# Patient Record
Sex: MALE | Race: WHITE | Employment: FULL TIME | URBAN - METROPOLITAN AREA
[De-identification: names, ages, dates, MRNs, and addresses within clinical notes are randomized per-mention and may not be internally consistent; named-entity substitution may affect disease eponyms.]

---

## 2020-09-02 ENCOUNTER — APPOINTMENT (OUTPATIENT)
Dept: GENERAL RADIOLOGY | Age: 65
DRG: 917 | End: 2020-09-02
Payer: COMMERCIAL

## 2020-09-02 ENCOUNTER — HOSPITAL ENCOUNTER (INPATIENT)
Age: 65
LOS: 2 days | Discharge: HOME | DRG: 917 | End: 2020-09-04
Attending: EMERGENCY MEDICINE | Admitting: INTERNAL MEDICINE
Payer: COMMERCIAL

## 2020-09-02 ENCOUNTER — APPOINTMENT (OUTPATIENT)
Dept: CT IMAGING | Age: 65
DRG: 917 | End: 2020-09-02
Payer: COMMERCIAL

## 2020-09-02 PROBLEM — R79.89 ELEVATED TROPONIN: Status: ACTIVE | Noted: 2020-09-02

## 2020-09-02 PROBLEM — R77.8 ELEVATED TROPONIN: Status: ACTIVE | Noted: 2020-09-02

## 2020-09-02 LAB
-: NORMAL
ABSOLUTE EOS #: 0 K/UL (ref 0–0.44)
ABSOLUTE IMMATURE GRANULOCYTE: 0 K/UL (ref 0–0.3)
ABSOLUTE LYMPH #: 0.57 K/UL (ref 1.1–3.7)
ABSOLUTE MONO #: 0.48 K/UL (ref 0.1–1.2)
ACETAMINOPHEN LEVEL: <5 UG/ML (ref 10–30)
ALBUMIN SERPL-MCNC: 3.4 G/DL (ref 3.5–5.2)
ALBUMIN/GLOBULIN RATIO: 1.1 (ref 1–2.5)
ALP BLD-CCNC: 104 U/L (ref 40–129)
ALT SERPL-CCNC: 58 U/L (ref 5–41)
AMMONIA: 80 UMOL/L (ref 16–60)
AMORPHOUS: NORMAL
AMPHETAMINE SCREEN URINE: NEGATIVE
ANION GAP SERPL CALCULATED.3IONS-SCNC: 12 MMOL/L (ref 9–17)
AST SERPL-CCNC: 71 U/L
BACTERIA: NORMAL
BARBITURATE SCREEN URINE: NEGATIVE
BASOPHILS # BLD: 0 % (ref 0–2)
BASOPHILS ABSOLUTE: 0 K/UL (ref 0–0.2)
BENZODIAZEPINE SCREEN, URINE: NEGATIVE
BILIRUB SERPL-MCNC: 0.28 MG/DL (ref 0.3–1.2)
BILIRUBIN URINE: NEGATIVE
BUN BLDV-MCNC: 19 MG/DL (ref 8–23)
BUN/CREAT BLD: ABNORMAL (ref 9–20)
BUPRENORPHINE URINE: ABNORMAL
CALCIUM SERPL-MCNC: 8.4 MG/DL (ref 8.6–10.4)
CANNABINOID SCREEN URINE: POSITIVE
CASTS UA: NORMAL /LPF (ref 0–8)
CHLORIDE BLD-SCNC: 105 MMOL/L (ref 98–107)
CHOLESTEROL/HDL RATIO: 3.3
CHOLESTEROL: 89 MG/DL
CO2: 22 MMOL/L (ref 20–31)
COCAINE METABOLITE, URINE: POSITIVE
COLOR: YELLOW
CREAT SERPL-MCNC: 1.2 MG/DL (ref 0.7–1.2)
CRYSTALS, UA: NORMAL /HPF
DIFFERENTIAL TYPE: ABNORMAL
EKG ATRIAL RATE: 96 BPM
EKG P AXIS: 74 DEGREES
EKG P-R INTERVAL: 142 MS
EKG Q-T INTERVAL: 362 MS
EKG QRS DURATION: 82 MS
EKG QTC CALCULATION (BAZETT): 457 MS
EKG R AXIS: 54 DEGREES
EKG T AXIS: 54 DEGREES
EKG VENTRICULAR RATE: 96 BPM
EOSINOPHILS RELATIVE PERCENT: 0 % (ref 1–4)
EPITHELIAL CELLS UA: NORMAL /HPF (ref 0–5)
ESTIMATED AVERAGE GLUCOSE: 114 MG/DL
ETHANOL PERCENT: <0.01 %
ETHANOL: <10 MG/DL
GFR AFRICAN AMERICAN: >60 ML/MIN
GFR NON-AFRICAN AMERICAN: >60 ML/MIN
GFR SERPL CREATININE-BSD FRML MDRD: ABNORMAL ML/MIN/{1.73_M2}
GFR SERPL CREATININE-BSD FRML MDRD: ABNORMAL ML/MIN/{1.73_M2}
GLUCOSE BLD-MCNC: 157 MG/DL (ref 70–99)
GLUCOSE URINE: NEGATIVE
HBA1C MFR BLD: 5.6 % (ref 4–6)
HCT VFR BLD CALC: 39.5 % (ref 40.7–50.3)
HCT VFR BLD CALC: 44.5 % (ref 40.7–50.3)
HDLC SERPL-MCNC: 27 MG/DL
HEMOGLOBIN: 12.7 G/DL (ref 13–17)
HEMOGLOBIN: 14.5 G/DL (ref 13–17)
IMMATURE GRANULOCYTES: 0 %
KETONES, URINE: NEGATIVE
LDL CHOLESTEROL: 48 MG/DL (ref 0–130)
LEUKOCYTE ESTERASE, URINE: NEGATIVE
LYMPHOCYTES # BLD: 6 % (ref 24–43)
MCH RBC QN AUTO: 31.3 PG (ref 25.2–33.5)
MCH RBC QN AUTO: 31.9 PG (ref 25.2–33.5)
MCHC RBC AUTO-ENTMCNC: 32.2 G/DL (ref 28.4–34.8)
MCHC RBC AUTO-ENTMCNC: 32.6 G/DL (ref 28.4–34.8)
MCV RBC AUTO: 97.3 FL (ref 82.6–102.9)
MCV RBC AUTO: 98 FL (ref 82.6–102.9)
MDMA URINE: ABNORMAL
METHADONE SCREEN, URINE: NEGATIVE
METHAMPHETAMINE, URINE: ABNORMAL
MONOCYTES # BLD: 5 % (ref 3–12)
MORPHOLOGY: NORMAL
MUCUS: NORMAL
NITRITE, URINE: NEGATIVE
NRBC AUTOMATED: 0 PER 100 WBC
NRBC AUTOMATED: 0 PER 100 WBC
OPIATES, URINE: NEGATIVE
OTHER OBSERVATIONS UA: NORMAL
OXYCODONE SCREEN URINE: NEGATIVE
PARTIAL THROMBOPLASTIN TIME: 28.4 SEC (ref 20.5–30.5)
PARTIAL THROMBOPLASTIN TIME: 35 SEC (ref 20.5–30.5)
PDW BLD-RTO: 13.2 % (ref 11.8–14.4)
PDW BLD-RTO: 13.2 % (ref 11.8–14.4)
PH UA: 5 (ref 5–8)
PHENCYCLIDINE, URINE: NEGATIVE
PLATELET # BLD: 108 K/UL (ref 138–453)
PLATELET # BLD: ABNORMAL K/UL (ref 138–453)
PLATELET ESTIMATE: ABNORMAL
PLATELET, FLUORESCENCE: 96 K/UL (ref 138–453)
PLATELET, IMMATURE FRACTION: 3.2 % (ref 1.1–10.3)
PMV BLD AUTO: 11.3 FL (ref 8.1–13.5)
PMV BLD AUTO: ABNORMAL FL (ref 8.1–13.5)
POTASSIUM SERPL-SCNC: 3.9 MMOL/L (ref 3.7–5.3)
PROPOXYPHENE, URINE: ABNORMAL
PROTEIN UA: NEGATIVE
RBC # BLD: 4.06 M/UL (ref 4.21–5.77)
RBC # BLD: 4.54 M/UL (ref 4.21–5.77)
RBC # BLD: ABNORMAL 10*6/UL
RBC UA: NORMAL /HPF (ref 0–4)
RENAL EPITHELIAL, UA: NORMAL /HPF
SALICYLATE LEVEL: <1 MG/DL (ref 3–10)
SEG NEUTROPHILS: 89 % (ref 36–65)
SEGMENTED NEUTROPHILS ABSOLUTE COUNT: 8.45 K/UL (ref 1.5–8.1)
SODIUM BLD-SCNC: 139 MMOL/L (ref 135–144)
SPECIFIC GRAVITY UA: 1.02 (ref 1–1.03)
TEST INFORMATION: ABNORMAL
TOTAL PROTEIN: 6.5 G/DL (ref 6.4–8.3)
TOXIC TRICYCLIC SC,BLOOD: NEGATIVE
TRICHOMONAS: NORMAL
TRICYCLIC ANTIDEPRESSANTS, UR: ABNORMAL
TRIGL SERPL-MCNC: 70 MG/DL
TROPONIN INTERP: ABNORMAL
TROPONIN T: ABNORMAL NG/ML
TROPONIN, HIGH SENSITIVITY: 29 NG/L (ref 0–22)
TROPONIN, HIGH SENSITIVITY: 32 NG/L (ref 0–22)
TROPONIN, HIGH SENSITIVITY: 58 NG/L (ref 0–22)
TROPONIN, HIGH SENSITIVITY: 67 NG/L (ref 0–22)
TURBIDITY: CLEAR
URINE HGB: NEGATIVE
UROBILINOGEN, URINE: NORMAL
VLDLC SERPL CALC-MCNC: ABNORMAL MG/DL (ref 1–30)
WBC # BLD: 6.8 K/UL (ref 3.5–11.3)
WBC # BLD: 9.5 K/UL (ref 3.5–11.3)
WBC # BLD: ABNORMAL 10*3/UL
WBC UA: NORMAL /HPF (ref 0–5)
YEAST: NORMAL

## 2020-09-02 PROCEDURE — 2580000003 HC RX 258: Performed by: STUDENT IN AN ORGANIZED HEALTH CARE EDUCATION/TRAINING PROGRAM

## 2020-09-02 PROCEDURE — 85055 RETICULATED PLATELET ASSAY: CPT

## 2020-09-02 PROCEDURE — 82140 ASSAY OF AMMONIA: CPT

## 2020-09-02 PROCEDURE — 93005 ELECTROCARDIOGRAM TRACING: CPT | Performed by: STUDENT IN AN ORGANIZED HEALTH CARE EDUCATION/TRAINING PROGRAM

## 2020-09-02 PROCEDURE — 99285 EMERGENCY DEPT VISIT HI MDM: CPT

## 2020-09-02 PROCEDURE — 80053 COMPREHEN METABOLIC PANEL: CPT

## 2020-09-02 PROCEDURE — 96374 THER/PROPH/DIAG INJ IV PUSH: CPT

## 2020-09-02 PROCEDURE — 83036 HEMOGLOBIN GLYCOSYLATED A1C: CPT

## 2020-09-02 PROCEDURE — 99223 1ST HOSP IP/OBS HIGH 75: CPT | Performed by: INTERNAL MEDICINE

## 2020-09-02 PROCEDURE — 36415 COLL VENOUS BLD VENIPUNCTURE: CPT

## 2020-09-02 PROCEDURE — 96375 TX/PRO/DX INJ NEW DRUG ADDON: CPT

## 2020-09-02 PROCEDURE — 70450 CT HEAD/BRAIN W/O DYE: CPT

## 2020-09-02 PROCEDURE — 85025 COMPLETE CBC W/AUTO DIFF WBC: CPT

## 2020-09-02 PROCEDURE — 6370000000 HC RX 637 (ALT 250 FOR IP): Performed by: STUDENT IN AN ORGANIZED HEALTH CARE EDUCATION/TRAINING PROGRAM

## 2020-09-02 PROCEDURE — 93010 ELECTROCARDIOGRAM REPORT: CPT | Performed by: INTERNAL MEDICINE

## 2020-09-02 PROCEDURE — 80061 LIPID PANEL: CPT

## 2020-09-02 PROCEDURE — G0480 DRUG TEST DEF 1-7 CLASSES: HCPCS

## 2020-09-02 PROCEDURE — 84484 ASSAY OF TROPONIN QUANT: CPT

## 2020-09-02 PROCEDURE — 6360000002 HC RX W HCPCS: Performed by: STUDENT IN AN ORGANIZED HEALTH CARE EDUCATION/TRAINING PROGRAM

## 2020-09-02 PROCEDURE — 93005 ELECTROCARDIOGRAM TRACING: CPT | Performed by: EMERGENCY MEDICINE

## 2020-09-02 PROCEDURE — 6360000002 HC RX W HCPCS: Performed by: EMERGENCY MEDICINE

## 2020-09-02 PROCEDURE — 81001 URINALYSIS AUTO W/SCOPE: CPT

## 2020-09-02 PROCEDURE — 85730 THROMBOPLASTIN TIME PARTIAL: CPT

## 2020-09-02 PROCEDURE — 85027 COMPLETE CBC AUTOMATED: CPT

## 2020-09-02 PROCEDURE — 80307 DRUG TEST PRSMV CHEM ANLYZR: CPT

## 2020-09-02 PROCEDURE — 2060000000 HC ICU INTERMEDIATE R&B

## 2020-09-02 PROCEDURE — 71045 X-RAY EXAM CHEST 1 VIEW: CPT

## 2020-09-02 RX ORDER — ONDANSETRON 2 MG/ML
4 INJECTION INTRAMUSCULAR; INTRAVENOUS ONCE
Status: COMPLETED | OUTPATIENT
Start: 2020-09-02 | End: 2020-09-02

## 2020-09-02 RX ORDER — ASPIRIN 81 MG/1
324 TABLET, CHEWABLE ORAL ONCE
Status: COMPLETED | OUTPATIENT
Start: 2020-09-02 | End: 2020-09-02

## 2020-09-02 RX ORDER — SODIUM CHLORIDE 0.9 % (FLUSH) 0.9 %
10 SYRINGE (ML) INJECTION EVERY 12 HOURS SCHEDULED
Status: DISCONTINUED | OUTPATIENT
Start: 2020-09-02 | End: 2020-09-04 | Stop reason: HOSPADM

## 2020-09-02 RX ORDER — SODIUM CHLORIDE 0.9 % (FLUSH) 0.9 %
10 SYRINGE (ML) INJECTION PRN
Status: DISCONTINUED | OUTPATIENT
Start: 2020-09-02 | End: 2020-09-04 | Stop reason: HOSPADM

## 2020-09-02 RX ORDER — HEPARIN SODIUM 1000 [USP'U]/ML
4000 INJECTION, SOLUTION INTRAVENOUS; SUBCUTANEOUS ONCE
Status: COMPLETED | OUTPATIENT
Start: 2020-09-02 | End: 2020-09-02

## 2020-09-02 RX ORDER — ACETAMINOPHEN 325 MG/1
650 TABLET ORAL EVERY 6 HOURS PRN
Status: DISCONTINUED | OUTPATIENT
Start: 2020-09-02 | End: 2020-09-04 | Stop reason: HOSPADM

## 2020-09-02 RX ORDER — ASPIRIN 81 MG/1
81 TABLET, CHEWABLE ORAL DAILY
Status: DISCONTINUED | OUTPATIENT
Start: 2020-09-03 | End: 2020-09-04 | Stop reason: HOSPADM

## 2020-09-02 RX ORDER — 0.9 % SODIUM CHLORIDE 0.9 %
1000 INTRAVENOUS SOLUTION INTRAVENOUS ONCE
Status: COMPLETED | OUTPATIENT
Start: 2020-09-02 | End: 2020-09-02

## 2020-09-02 RX ORDER — POLYETHYLENE GLYCOL 3350 17 G/17G
17 POWDER, FOR SOLUTION ORAL DAILY PRN
Status: DISCONTINUED | OUTPATIENT
Start: 2020-09-02 | End: 2020-09-04 | Stop reason: HOSPADM

## 2020-09-02 RX ORDER — ACETAMINOPHEN 325 MG/1
650 TABLET ORAL EVERY 4 HOURS PRN
Status: DISCONTINUED | OUTPATIENT
Start: 2020-09-02 | End: 2020-09-04 | Stop reason: HOSPADM

## 2020-09-02 RX ORDER — HEPARIN SODIUM 1000 [USP'U]/ML
4000 INJECTION, SOLUTION INTRAVENOUS; SUBCUTANEOUS PRN
Status: DISCONTINUED | OUTPATIENT
Start: 2020-09-02 | End: 2020-09-03

## 2020-09-02 RX ORDER — PROMETHAZINE HYDROCHLORIDE 25 MG/1
12.5 TABLET ORAL EVERY 6 HOURS PRN
Status: DISCONTINUED | OUTPATIENT
Start: 2020-09-02 | End: 2020-09-04 | Stop reason: HOSPADM

## 2020-09-02 RX ORDER — ATORVASTATIN CALCIUM 40 MG/1
40 TABLET, FILM COATED ORAL NIGHTLY
Status: DISCONTINUED | OUTPATIENT
Start: 2020-09-02 | End: 2020-09-04 | Stop reason: HOSPADM

## 2020-09-02 RX ORDER — ACETAMINOPHEN 650 MG/1
650 SUPPOSITORY RECTAL EVERY 6 HOURS PRN
Status: DISCONTINUED | OUTPATIENT
Start: 2020-09-02 | End: 2020-09-04 | Stop reason: HOSPADM

## 2020-09-02 RX ORDER — HEPARIN SODIUM 1000 [USP'U]/ML
2000 INJECTION, SOLUTION INTRAVENOUS; SUBCUTANEOUS PRN
Status: DISCONTINUED | OUTPATIENT
Start: 2020-09-02 | End: 2020-09-03

## 2020-09-02 RX ORDER — HEPARIN SODIUM 10000 [USP'U]/100ML
12 INJECTION, SOLUTION INTRAVENOUS CONTINUOUS
Status: DISCONTINUED | OUTPATIENT
Start: 2020-09-02 | End: 2020-09-03

## 2020-09-02 RX ORDER — ONDANSETRON 2 MG/ML
4 INJECTION INTRAMUSCULAR; INTRAVENOUS EVERY 6 HOURS PRN
Status: DISCONTINUED | OUTPATIENT
Start: 2020-09-02 | End: 2020-09-04 | Stop reason: HOSPADM

## 2020-09-02 RX ADMIN — SODIUM CHLORIDE, PRESERVATIVE FREE 10 ML: 5 INJECTION INTRAVENOUS at 20:56

## 2020-09-02 RX ADMIN — ASPIRIN 324 MG: 81 TABLET, CHEWABLE ORAL at 07:54

## 2020-09-02 RX ADMIN — SODIUM CHLORIDE, PRESERVATIVE FREE 10 ML: 5 INJECTION INTRAVENOUS at 20:55

## 2020-09-02 RX ADMIN — HEPARIN SODIUM 2000 UNITS: 1000 INJECTION INTRAVENOUS; SUBCUTANEOUS at 18:13

## 2020-09-02 RX ADMIN — ONDANSETRON 4 MG: 2 INJECTION INTRAMUSCULAR; INTRAVENOUS at 05:38

## 2020-09-02 RX ADMIN — HEPARIN SODIUM AND DEXTROSE 12 UNITS/KG/HR: 10000; 5 INJECTION INTRAVENOUS at 09:54

## 2020-09-02 RX ADMIN — ATORVASTATIN CALCIUM 40 MG: 80 TABLET, FILM COATED ORAL at 13:42

## 2020-09-02 RX ADMIN — HEPARIN SODIUM 4000 UNITS: 1000 INJECTION INTRAVENOUS; SUBCUTANEOUS at 09:55

## 2020-09-02 RX ADMIN — SODIUM CHLORIDE 1000 ML: 9 INJECTION, SOLUTION INTRAVENOUS at 05:38

## 2020-09-02 NOTE — ED NOTES
Wife at bedside, updated on [address], [facility]  09/02/20 5618

## 2020-09-02 NOTE — ED NOTES
ED to inpatient nurses report    Chief Complaint   Patient presents with    Seizures      Present to ED from home  LOC: alert x4  Vital signs   Vitals:    09/02/20 1145 09/02/20 1230 09/02/20 1247 09/02/20 1252   BP: 103/70 101/68 97/66 104/72   Pulse: 58 62 67 76   Resp: 11 15 11 12   Temp:       TempSrc:       SpO2: 96% 97% 96% 96%   Weight:       Height:          Oxygen Baseline 2L    Current needs required 2L  LDAs:   Peripheral IV 09/02/20 Left Forearm (Active)       Peripheral IV 09/02/20 Right Wrist (Active)   Site Assessment Clean;Dry; Intact 09/02/20 0809   Line Status Blood return noted;Brisk blood return;Specimen collected; Flushed 09/02/20 0809   Dressing Status Clean;Dry; Intact 09/02/20 0809   Dressing Intervention New 09/02/20 0809     Mobility: up with assistance  Pending ED orders: n/a  Present condition: stable, A&Ox4  Code Status: full code  Consults:  [x]  Hospitalist  Completed  [x] yes [] no  []  Medicine  Completed  [] yes [] No  [x]  Cardiology  Completed  [] yes [x] No  []  GI   Completed  [] yes [] No  []  Neurology  Completed  [] yes [] No  []  Nephrology Completed  [] yes [] No  []  Vascular  Completed  [] yes [] No   []  Surgery  Completed  [] yes [] No   []  Urology  Completed  [] yes [] No   []  Plastics  Completed  [] yes [] No   []  ENT  Completed  [] yes [] No   []  Other  Completed  [] yes [] No   Pertinent event(s) no seizure activity, resting comfortable. Elevated trops trending up, on heparin, next APTT due at 1600 (will send before pt leaves the floor).   Pertinent event(s)   Electronically signed by Cornell Ribeiro RN on 9/2/2020 at 3:24 PM       Cornell Ribeiro RN  09/02/20 9299

## 2020-09-02 NOTE — ED NOTES
Repeat troponin collected, labeled, and sent to lab via tube system      Arpan Gonzales RN  09/02/20 0307

## 2020-09-02 NOTE — ED NOTES
Pt resting with eyes closed. RR even and non labored. NAD noted.    Call light in [facility], RN  09/02/20 0464

## 2020-09-02 NOTE — CARE COORDINATION
Case Management Initial Discharge Plan  Bala Goldsmith,             Met with:patient to discuss discharge plans. Information verified: address, contacts, phone number, , insurance Yes    Emergency Contact/Next of Kin name & number: Hosea Holt wife [phone]    PCP: No primary care provider on file. Date of last visit: none, list provided    Insurance Provider: [facility], called wife and asked if the next time she visits if she could bring his insurance card. She will be up later tonight and will bring insurnance card with her. Called info/reg and informed them wife to bring insurance card. Called Shari Wynn in HELP and left him vm that patient has insurance. Discharge Planning    Living Arrangements:  Spouse/Significant Other   Support Systems:  Spouse/Significant Other    Home has 2 stories  6 stairs to climb to get into front door, 1 flight stairs to climb to reach second floor  Location of bedroom/bathroom in home main    Patient able to perform ADL's:Independent    Current Services (outpatient & in home) none  DME equipment: none  DME provider: n/a    Receiving oral anticoagulation therapy? No    If indicated:   Physician managing anticoagulation treatment: none  Where does patient obtain lab work for ATC treatment? n/a      Potential Assistance Needed:  N/A    Patient agreeable to home care: No  [place] of choice provided:  n/a    Prior SNF/Rehab Placement and Facility: none  Agreeable to SNF/Rehab: No  [place] of choice provided: n/a     Evaluation: n/a    Expected Discharge date:       Patient expects to be discharged to:  home  Follow Up Appointment: Best Day/ Time:      Transportation provider: wife  Transportation arrangements needed for discharge: No    Readmission Risk              Risk of Unplanned Readmission:        9             Does patient have a readmission risk score greater than 14?: No  If yes, follow-up appointment must be made within 7 days of discharge.      Goals of Care: improve pain control      Discharge Plan: home with wife. PCP list provided. He has LV [employer]. Wife called to bring in card. Info registration called to notify them wife will bring in card.            Electronically signed by Tawanda Iverson RN on 9/2/20 at 5:38 PM EDT

## 2020-09-02 NOTE — ED NOTES
Pt ED for possible seizure like activity as pt family found pt unresponsive at home on the couch. Pt arrives answering all questions appropriately. Pt denies any drug use but EMS gave 2mg and pt became more alert. Pt denies any pain or medical needs at this time. Pt placed on full cardiac monitor, EKG complete, blood obtained awaiting orders.       [address] [place] [address]  96/78/71 [id number]

## 2020-09-02 NOTE — ED PROVIDER NOTES
[address]  ED  Emergency Department Encounter  EmergencyMedicine Resident     Pt Karina Ledezma  MRN: [id number]  [place] 1955  Date of evaluation: 9/2/20  PCP:  No primary care provider on file. CHIEF COMPLAINT       No chief complaint on file. HISTORY OF PRESENT ILLNESS  (Location/Symptom, Timing/Onset, Context/Setting, Quality, Duration, Modifying Factors, Severity.)      Leonarda Carrillo is a 63 y.o. male who presents with altered mental status, found on hands and knees by family, brought in by EMS who gave 1 treatment of Narcan in route. Reported that pupils went from pinpoint to larger. Patient does admit to past history of opiate use but denies having used today. Denies history of seizures denies history of stroke denies fall or injury. Patient complains of feeling generally unwell but was well prior to the episode today. No witnessed seizure-like activity. No signs of trauma, limited to no medical history in chart. Patient states no regular medications no blood thinners no antiplatelet. Is afebrile hemodynamically stable on triage. Extensive track marks on both arms, presumably from patient's admitted history of IV drug use. PAST MEDICAL / SURGICAL / SOCIAL / FAMILY HISTORY      has no past medical history on file. has no past surgical history on file.     Social History     Socioeconomic History    Marital status:      Spouse name: Not on file    Number of children: Not on file    Years of education: Not on file    Highest education level: Not on file   Occupational History    Not on file   Social Needs    Financial resource strain: Not on file    Food insecurity     Worry: Not on file     Inability: Not on file    Transportation needs     Medical: Not on file     Non-medical: Not on file   Tobacco Use    Smoking status: Current Every Day Smoker    Smokeless tobacco: Never Used   Substance and Sexual Activity    Alcohol use: Not on membranes moist, oropharynx clear     Neck: Trachea midline, no JVD. Lungs: No evidence of increased work of breathing. CTA B/L, no wheezes/rhonchi     Cardiovascular: RRR, no murmur, 2+ peripheral pulses bilaterally. Cap refill less than 2 seconds. No lower extremity edema noted    Abdomen: Soft, nontender. No guarding or rebound tenderness. Neurologic: Oriented to person place not time or event, pupils equal and reactive, 5 mm bilaterally status post Narcan, following commands no drift no facial weakness    Extremities: Skin warm, dry and intact.     DIFFERENTIAL  DIAGNOSIS     PLAN (LABS / IMAGING / EKG):  Orders Placed This Encounter   Procedures    CT HEAD WO CONTRAST    XR CHEST PORTABLE    Urine Drug Screen    TOX SCR, BLD, ED    CBC WITH AUTO DIFFERENTIAL    Comprehensive Metabolic Panel    Urinalysis with microscopic    Troponin    AMMONIA    Troponin    Inpatient consult to Hospitalist    Inpatient consult to Cardiology    EKG 12 Lead    EKG 12 Lead       MEDICATIONS ORDERED:  Orders Placed This Encounter   Medications    0.9 % sodium chloride bolus    ondansetron (ZOFRAN) injection 4 mg    aspirin chewable tablet 324 mg           DIAGNOSTIC RESULTS / EMERGENCY DEPARTMENT COURSE / MDM     LABS:  Results for orders placed or performed during the hospital encounter of 09/02/20   TOX SCR, BLD, ED   Result Value Ref Range    Acetaminophen Level <5 (L) 10 - 30 ug/mL    Ethanol <10 <10 mg/dL    Ethanol percent <7.105 <6.659 %    Salicylate Lvl <1 (L) 3 - 10 mg/dL    Toxic Tricyclic Sc,Blood NEGATIVE NEGATIVE   CBC WITH AUTO DIFFERENTIAL   Result Value Ref Range    WBC 9.5 3.5 - 11.3 k/uL    RBC 4.54 4.21 - 5.77 m/uL    Hemoglobin 14.5 13.0 - 17.0 g/dL    Hematocrit 44.5 40.7 - 50.3 %    MCV 98.0 82.6 - 102.9 fL    MCH 31.9 25.2 - 33.5 pg    MCHC 32.6 28.4 - 34.8 g/dL    RDW 13.2 11.8 - 14.4 %    Platelets 179 (L) 976 - 453 k/uL    MPV 11.3 8.1 - 13.5 fL    NRBC Automated 0.0 0.0 per 100 WBC    Differential Type NOT REPORTED     WBC Morphology NOT REPORTED     RBC Morphology NOT REPORTED     Platelet Estimate NOT REPORTED     Immature Granulocytes 0 0 %    Seg Neutrophils 89 (H) 36 - 65 %    Lymphocytes 6 (L) 24 - 43 %    Monocytes 5 3 - 12 %    Eosinophils % 0 (L) 1 - 4 %    Basophils 0 0 - 2 %    Absolute Immature Granulocyte 0.00 0.00 - 0.30 k/uL    Segs Absolute 8.45 (H) 1.50 - 8.10 k/uL    Absolute Lymph # 0.57 (L) 1.10 - 3.70 k/uL    Absolute Mono # 0.48 0.10 - 1.20 k/uL    Absolute Eos # 0.00 0.00 - 0.44 k/uL    Basophils Absolute 0.00 0.00 - 0.20 k/uL    Morphology Normal    Comprehensive Metabolic Panel   Result Value Ref Range    Glucose 157 (H) 70 - 99 mg/dL    BUN 19 8 - 23 mg/dL    CREATININE 1.20 0.70 - 1.20 mg/dL    Bun/Cre Ratio NOT REPORTED 9 - 20    Calcium 8.4 (L) 8.6 - 10.4 mg/dL    Sodium 139 135 - 144 mmol/L    Potassium 3.9 3.7 - 5.3 mmol/L    Chloride 105 98 - 107 mmol/L    CO2 22 20 - 31 mmol/L    Anion Gap 12 9 - 17 mmol/L    Alkaline Phosphatase 104 40 - 129 U/L    ALT 58 (H) 5 - 41 U/L    AST 71 (H) <40 U/L    Total Bilirubin 0.28 (L) 0.3 - 1.2 mg/dL    Total Protein 6.5 6.4 - 8.3 g/dL    Alb 3.4 (L) 3.5 - 5.2 g/dL    Albumin/Globulin Ratio 1.1 1.0 - 2.5    GFR Non-African American >60 >60 mL/min    GFR African American >60 >60 mL/min    GFR Comment          GFR Staging NOT REPORTED    Troponin   Result Value Ref Range    Troponin, High Sensitivity 32 (H) 0 - 22 ng/L    Troponin T NOT REPORTED <0.03 ng/mL    Troponin Interp NOT REPORTED    AMMONIA   Result Value Ref Range    Ammonia 80 (H) 16 - 60 umol/L   Troponin   Result Value Ref Range    Troponin, High Sensitivity 58 (HH) 0 - 22 ng/L    Troponin T NOT REPORTED <0.03 ng/mL    Troponin Interp NOT REPORTED    EKG 12 Lead   Result Value Ref Range    Ventricular Rate 96 BPM    Atrial Rate 96 BPM    P-R Interval 142 ms    QRS Duration 82 ms    Q-T Interval 362 ms    QTc Calculation (Bazett) 457 ms    P Axis 74 degrees R Axis 54 degrees    T Axis 54 degrees     RADIOLOGY:  Ct Head Wo Contrast    Result Date: 9/2/2020  EXAMINATION: CT OF THE HEAD WITHOUT CONTRAST  9/2/2020 5:27 am TECHNIQUE: CT of the head was performed without the administration of intravenous contrast. Dose modulation, iterative reconstruction, and/or weight based adjustment of the mA/kV was utilized to reduce the radiation dose to as low as reasonably achievable. COMPARISON: None. HISTORY: ORDERING SYSTEM PROVIDED HISTORY: AMS, possible seizure TECHNOLOGIST PROVIDED HISTORY: AMS, possible seizure FINDINGS: BRAIN/VENTRICLES: There is no acute intracranial hemorrhage, mass effect or midline shift. No abnormal extra-axial fluid collection. The gray-white differentiation is maintained without evidence of an acute infarct. There is no evidence of hydrocephalus. ORBITS: The visualized portion of the orbits demonstrate no acute abnormality. SINUSES: The visualized paranasal sinuses and mastoid air cells demonstrate no acute abnormality. SOFT TISSUES/SKULL:  No acute abnormality of the visualized skull or soft tissues. No acute intracranial abnormality. EKG  Normal sinus rhythm no ST changes suggestive of STEMI, normal intervals  Good R wave progression no ST elevations or depressions    All EKG's are interpreted by the Emergency Department Physician who either signs or Co-signs this chart in the absence of a cardiologist.    EMERGENCY DEPARTMENT COURSE/IMPRESSION:  ED Course as of Sep 02 0755   Wed Sep 02, 2020   0737 Second troponin elevated to 58 from 32, cardiology paged, repeat EKG and chest x-ray ordered patient still not having chest pain.   Reports no history of cardiac disease reports no cocaine use reports no shortness of breath feeling subjectively much better    [RD]   0737 Aspirin ordered    [RD]      ED Course User Index  [RD] Harinder Roberts MD   Altered mental status possible opiate use, EMS suspected possible seizure-like activity but no witnessed seizure-like episode status post Narcan, hemodynamically stable, limited medical history, CT head without bleed  NIH 0  Labs pending  Troponin 32, will trend no chest pain no shortness of breath no tachycardia at the moment. Follow-up repeat troponin  Ammonia mildly elevated no flapping asterixis, mild transaminitis lower suspicion for hepatic encephalopathy as a cause of the altered mental status today. Ethanol 0 tox screen negative. Awaiting UA and drug screen. Spoke to cardiology, sent EKG, and cardiology fellow asked that the troponin be trended and to start heparin if over 66. Will plan to admit to medicine. PROCEDURES:  None    CONSULTS:  IP CONSULT TO HOSPITALIST  IP CONSULT TO CARDIOLOGY    CRITICAL CARE:  None    FINAL IMPRESSION      1. Altered mental status, unspecified altered mental status type          DISPOSITION / PLAN     DISPOSITION        PATIENT REFERRED TO:  No follow-up provider specified.     DISCHARGE MEDICATIONS:  New Prescriptions    No medications on file       Shay Avina MD  Emergency Medicine Resident    (Please note that portions of this note were completed with a voice recognition program.  Efforts were made to edit the dictations but occasionally words aremis-transcribed.)       Shay Avina MD  Resident  09/02/20 7924

## 2020-09-02 NOTE — ED PROVIDER NOTES
intervention. Total critical care time was 15 minutes. This excludes any time for separately reportable procedures.        [facility], DO  09/02/20 [facility], DO  09/02/20 [facility], DO  09/02/20 [address], DO  09/02/20 [address], DO  09/02/20 [address], DO  09/02/20 0800

## 2020-09-02 NOTE — ED NOTES
Bed: 20  Expected date: 9/2/20  Expected time: 4:46 AM  Means of arrival: [facility]  Comments:  LS3  Seizure without history      Jared Ellis RN  09/02/20 5511

## 2020-09-02 NOTE — ED PROVIDER NOTES
[address] HANDOFF       Handoff taken on the following patient from prior Attending Physician:  Pt Name: Lamin Wilson  PCP:  No primary care provider on file. Attestation  I was available and discussed any additional care issues that arose and coordinated the management plans with the resident(s) caring for the patient during my duty period. Any areas of disagreement with resident's documentation of care or procedures are noted on the chart. I was personally present for the key portions of any/all procedures during my duty period. I have documented in the chart those procedures where I was not present during the key portions. CHIEF COMPLAINT     No chief complaint on file. CURRENT MEDICATIONS     Previous Medications  Previous Medications    No medications on file       Encounter Medications  Orders Placed This Encounter   Medications    0.9 % sodium chloride bolus    ondansetron (ZOFRAN) injection 4 mg    aspirin chewable tablet 324 mg       ALLERGIES     has No Known Allergies. RECENT VITALS:   Temp: 98 °F (36.7 °C),  Pulse: 89, Resp: 16, BP: 123/76    RADIOLOGY:   XR CHEST PORTABLE   Final Result   Expiratory exam with mild prominence of the pulmonary   vascularity/interstitial markings, findings suggest mild vascular congestion      Mild streaky bibasilar atelectasis         CT HEAD WO CONTRAST   Final Result   No acute intracranial abnormality.              LABS:  Labs Reviewed   TOX SCR, BLD, ED - Abnormal; Notable for the following components:       Result Value    Acetaminophen Level <5 (*)     Salicylate Lvl <1 (*)     All other components within normal limits   CBC WITH AUTO DIFFERENTIAL - Abnormal; Notable for the following components:    Platelets 123 (*)     Seg Neutrophils 89 (*)     Lymphocytes 6 (*)     Eosinophils % 0 (*)     Segs Absolute 8.45 (*)     Absolute Lymph # 0.57 (*)     All other components within normal limits COMPREHENSIVE METABOLIC PANEL - Abnormal; Notable for the following components:    Glucose 157 (*)     Calcium 8.4 (*)     ALT 58 (*)     AST 71 (*)     Total Bilirubin 0.28 (*)     Alb 3.4 (*)     All other components within normal limits   TROPONIN - Abnormal; Notable for the following components:    Troponin, High Sensitivity 32 (*)     All other components within normal limits   AMMONIA - Abnormal; Notable for the following components:    Ammonia 80 (*)     All other components within normal limits   TROPONIN - Abnormal; Notable for the following components:    Troponin, High Sensitivity 58 (*)     All other components within normal limits   URINE DRUG SCREEN   URINALYSIS WITH MICROSCOPIC           PLAN/ TASKS OUTSTANDING     This patient is a 63 y.o. Male. Patient placed in observation for cardiac work-up. Troponin from 30-60, asymptomatic at this time. .  Cardiology consulted, will likely start heparin.     (Please note that portions of this note were completed with a voice recognition program.  Efforts were made to edit the dictations but occasionally words are mis-transcribed.)    Rabago MD  Attending Emergency Physician       Omero Moreira MD  09/02/20 2545

## 2020-09-02 NOTE — ED PROVIDER NOTES
troponins. The patient is admitted on heparin currently awaiting bed placement. Darrius Brantley MD, F.A.C.E.P.   Attending Emergency Physician    (Please note that portions of this note were completed with a voice recognition program.  Efforts were made to edit the dictations but occasionally words are mis-transcribed.)         Darrius Brantley MD  09/02/20 5735

## 2020-09-02 NOTE — ED NOTES
Pt resting on stretcher in NAD. RR even and non labored. Denies needs at this time. Call light in reach.        German Ramírez RN  09/02/20 2334

## 2020-09-02 NOTE — H&P
[facility]     Department of Internal Medicine - Staff Internal Medicine Teaching Service          ADMISSION NOTE/HISTORY AND PHYSICAL EXAMINATION   Date: 9/2/2020  Patient Name: Dasia Miramontes  Date of admission: 9/2/2020  4:55 AM  YOB: 1955  PCP: No primary care provider on file. History Obtained From:  patient    CHIEF COMPLAINT     Chief complaint: Altered mental status/syncope    HISTORY OF PRESENTING ILLNESS     The patient is a pleasant 63 y.o. male presents with a chief complaint of  altered mental status . He was found unresponsive at home, EMS was called in. No seizure like activity noted. Patient doesn't recall any of those events and said that all he knows he was watching [email or web address] and he just found himself in ambulance after a while. He had no aura,dizziness,diaphoresis or any weird feeling before he passed out. No chest pain, shortness of breath, funny sensation in heart . No previous similar episodes. No previous cardiac history or seizure history. Doesn't take any medications except aspirin for headaches which occur maybe twice a year. Doesn't follow up with any doctor. Doesn't have any flu like symptoms, any pain, fever,urinary or bowel complaints. No nausea or vomiting. No recent injury or trauma or fall.   he does have history of narcotic,opiod use, denies cocaine use    His pupils were pinpoint and  1 narcan was given on way to hospital    On my visit in ED, patient is awake and alert, pupils normal  Afebrile,vitals stable,saturating well on room air. Extensive track marks on both arms,presumably from patient's history of IV drug abuse. troponins rising,32>58>67, aspirin ordered,cardiology consulted. Patient started on heparin. Normal sinus rhythm on EKG, no ischemic changes  CT head negative for any acute abnormality. CXR- mild vascular congestion,mild bibasilar atelactasis  Blood tox negative.   Glucose 157,Hb-10.7  ALT-58,AST-71  Ammonia mildly elevated-80 ,no flapping asterixis  U tox positive for cannabinoids and cocaine. Urine analysis negative for infection      Review of Systems:  General ROS: Completed and except as mentioned above were negative   HEENT ROS: Completed and except as mentioned above were negative   Allergy and Immunology ROS:  Completed and except as mentioned above were negative  Hematological and Lymphatic ROS:  Completed and except as mentioned above were negative  Respiratory ROS:  Completed and except as mentioned above were negative  Cardiovascular ROS:  Completed and except as mentioned above were negative  Gastrointestinal ROS: Completed and except as mentioned above were negative  Genito-Urinary ROS:  Completed and except as mentioned above were negative  Musculoskeletal ROS:  Completed and except as mentioned above were negative  Neurological ROS:  Completed and except as mentioned above were negative  Skin & Dermatological ROS:  Completed and except as mentioned above were negative  Psychological ROS:  Completed and except as mentioned above were negative    PAST MEDICAL HISTORY     No past medical history on file. PAST SURGICAL HISTORY     No past surgical history on file. ALLERGIES     Patient has no known allergies. MEDICATIONS PRIOR TO ADMISSION     Prior to Admission medications    Not on File       SOCIAL HISTORY     Tobacco: current every day smoker,half pack a day for 40 yrs  Alcohol: denies  Illicits: opiod use  Occupation: [employer]    FAMILY HISTORY     No family history on file. PHYSICAL EXAM     Vitals: /72 Comment: standing  Pulse 76   Temp 98 °F (36.7 °C) (Oral)   Resp 12   Ht 6' (1.829 m)   Wt 160 lb (72.6 kg)   SpO2 96%   BMI 21.70 kg/m²   Tmax: Temp (24hrs), Av °F (36.7 °C), Min:98 °F (36.7 °C), Max:98 °F (36.7 °C)    Last Body weight:   Wt Readings from Last 3 Encounters:   20 160 lb (72.6 kg)     Body Mass Index : Body mass index is 21.7 kg/m².       PHYSICAL EXAMINATION:  Constitutional: This is a well developed, well nourished, 18.5-24.9 - Normal 63y.o. year old male who is alert, oriented, cooperative and in no apparent distress. Head:normocephalic and atraumatic. EENT:  PERRLA. No conjunctival injections. Septum was midline, mucosa was without erythema, exudates or cobblestoning. No thrush was noted. Neck: Supple without thyromegaly. No elevated JVP. Trachea was midline. Respiratory: Chest was symmetrical without dullness to percussion. Breath sounds bilaterally were clear to auscultation. There were no wheezes, rhonchi or rales. There is no intercostal retraction or use of accessory muscles. No egophony noted. Cardiovascular: Regular without murmur, clicks, gallops or rubs. Abdomen: Slightly rounded and soft without organomegaly. No rebound, rigidity or guarding was appreciated. Lymphatic: No lymphadenopathy. Musculoskeletal: Normal curvature of the spine. No gross muscle weakness. Extremities:  No lower extremity edema, ulcerations, tenderness, varicosities or erythema. Muscle size, tone and strength are normal.  No involuntary movements are noted. Skin:  Warm and dry. Good color, turgor and pigmentation. No lesions or scars.   No cyanosis or clubbing  Neurological/Psychiatric: The patient's general behavior, level of consciousness, thought content and emotional status is normal.          INVESTIGATIONS     Laboratory Testing:     Recent Results (from the past 24 hour(s))   EKG 12 Lead    Collection Time: 09/02/20  5:03 AM   Result Value Ref Range    Ventricular Rate 96 BPM    Atrial Rate 96 BPM    P-R Interval 142 ms    QRS Duration 82 ms    Q-T Interval 362 ms    QTc Calculation (Bazett) 457 ms    P Axis 74 degrees    R Axis 54 degrees    T Axis 54 degrees   TOX SCR, BLD, ED    Collection Time: 09/02/20  5:27 AM   Result Value Ref Range    Acetaminophen Level <5 (L) 10 - 30 ug/mL    Ethanol <10 <10 mg/dL    Ethanol percent <0.010 <9.939 %    Salicylate Lvl <1 (L) 3 - 10 mg/dL    Toxic Tricyclic Sc,Blood NEGATIVE NEGATIVE   CBC WITH AUTO DIFFERENTIAL    Collection Time: 09/02/20  5:27 AM   Result Value Ref Range    WBC 9.5 3.5 - 11.3 k/uL    RBC 4.54 4.21 - 5.77 m/uL    Hemoglobin 14.5 13.0 - 17.0 g/dL    Hematocrit 44.5 40.7 - 50.3 %    MCV 98.0 82.6 - 102.9 fL    MCH 31.9 25.2 - 33.5 pg    MCHC 32.6 28.4 - 34.8 g/dL    RDW 13.2 11.8 - 14.4 %    Platelets 169 (L) 771 - 453 k/uL    MPV 11.3 8.1 - 13.5 fL    NRBC Automated 0.0 0.0 per 100 WBC    Differential Type NOT REPORTED     WBC Morphology NOT REPORTED     RBC Morphology NOT REPORTED     Platelet Estimate NOT REPORTED     Immature Granulocytes 0 0 %    Seg Neutrophils 89 (H) 36 - 65 %    Lymphocytes 6 (L) 24 - 43 %    Monocytes 5 3 - 12 %    Eosinophils % 0 (L) 1 - 4 %    Basophils 0 0 - 2 %    Absolute Immature Granulocyte 0.00 0.00 - 0.30 k/uL    Segs Absolute 8.45 (H) 1.50 - 8.10 k/uL    Absolute Lymph # 0.57 (L) 1.10 - 3.70 k/uL    Absolute Mono # 0.48 0.10 - 1.20 k/uL    Absolute Eos # 0.00 0.00 - 0.44 k/uL    Basophils Absolute 0.00 0.00 - 0.20 k/uL    Morphology Normal    Comprehensive Metabolic Panel    Collection Time: 09/02/20  5:27 AM   Result Value Ref Range    Glucose 157 (H) 70 - 99 mg/dL    BUN 19 8 - 23 mg/dL    CREATININE 1.20 0.70 - 1.20 mg/dL    Bun/Cre Ratio NOT REPORTED 9 - 20    Calcium 8.4 (L) 8.6 - 10.4 mg/dL    Sodium 139 135 - 144 mmol/L    Potassium 3.9 3.7 - 5.3 mmol/L    Chloride 105 98 - 107 mmol/L    CO2 22 20 - 31 mmol/L    Anion Gap 12 9 - 17 mmol/L    Alkaline Phosphatase 104 40 - 129 U/L    ALT 58 (H) 5 - 41 U/L    AST 71 (H) <40 U/L    Total Bilirubin 0.28 (L) 0.3 - 1.2 mg/dL    Total Protein 6.5 6.4 - 8.3 g/dL    Alb 3.4 (L) 3.5 - 5.2 g/dL    Albumin/Globulin Ratio 1.1 1.0 - 2.5    GFR Non-African American >60 >60 mL/min    GFR African American >60 >60 mL/min    GFR Comment          GFR Staging NOT REPORTED    Troponin    Collection Time: 09/02/20 NEGATIVE    Benzodiazepine Screen, Urine NEGATIVE NEGATIVE    Cocaine Metabolite, Urine POSITIVE (A) NEGATIVE    Methadone Screen, Urine NEGATIVE NEGATIVE    Opiates, Urine NEGATIVE NEGATIVE    Phencyclidine, Urine NEGATIVE NEGATIVE    Propoxyphene, Urine NOT REPORTED NEGATIVE    Cannabinoid Scrn, Ur POSITIVE (A) NEGATIVE    Oxycodone Screen, Ur NEGATIVE NEGATIVE    Methamphetamine, Urine NOT REPORTED NEGATIVE    Tricyclic Antidepressants, Urine NOT REPORTED NEGATIVE    MDMA, Urine NOT REPORTED NEGATIVE    Buprenorphine Urine NOT REPORTED NEGATIVE    Test Information       Assay provides medical screening only. The absence of expected drug(s) and/or metabolite(s) may indicate diluted or adulterated urine, limitations of testing or timing of collection. Urinalysis with microscopic    Collection Time: 09/02/20  1:09 PM   Result Value Ref Range    Color, UA YELLOW YELLOW    Turbidity UA CLEAR CLEAR    Glucose, Ur NEGATIVE NEGATIVE    Bilirubin Urine NEGATIVE NEGATIVE    Ketones, Urine NEGATIVE NEGATIVE    Specific Gravity, UA 1.024 1.005 - 1.030    Urine Hgb NEGATIVE NEGATIVE    pH, UA 5.0 5.0 - 8.0    Protein, UA NEGATIVE NEGATIVE    Urobilinogen, Urine Normal Normal    Nitrite, Urine NEGATIVE NEGATIVE    Leukocyte Esterase, Urine NEGATIVE NEGATIVE    -          WBC, UA None 0 - 5 /HPF    RBC, UA None 0 - 4 /HPF    Casts UA  0 - 8 /LPF     2 TO 5 HYALINE Reference range defined for non-centrifuged specimen. Crystals, UA NOT REPORTED None /HPF    Epithelial Cells UA None 0 - 5 /HPF    Renal Epithelial, UA NOT REPORTED 0 /HPF    Bacteria, UA NOT REPORTED None    Mucus, UA NOT REPORTED None    Trichomonas, UA NOT REPORTED None    Amorphous, UA NOT REPORTED None    Other Observations UA NOT REPORTED NOT REQ. Yeast, UA NOT REPORTED None       Imaging:   Ct Head Wo Contrast    Result Date: 9/2/2020  No acute intracranial abnormality.      Xr Chest Portable    Result Date: 9/2/2020  Expiratory exam with mild prominence of the pulmonary vascularity/interstitial markings, findings suggest mild vascular congestion Mild streaky bibasilar atelectasis       ASSESSMENT & PLAN     ASSESSMENT / PLAN:     IMPRESSION  This is a 63 y.o. male who presented with altered mental status. Active Problems:    Altered mental status/ drug induced encephalopathy  Possibly because of drug overdose,received 1 Narcan before coming to hospital  Utox positive for cannabinoids and cocaine. Ammonia mildly elevated-80 ,no flapping asterixis  Will continue to monitor. Elevated troponin  32>58>67  Plan: started on heparin,cardiology consulted  Started on aspirin and statin. Patient NPO after midnight. F/u with Echo      F/u with lipid panel and HbA1c. DVT ppx: already on heparin  GI ppx: not indicated    PT/OT/SW: ongoing  Discharge Rebecca Bryant MD  Internal Medicine Resident, [facility]; [place], [place]  9/2/2020, 3:02 PM   Attending Physician Statement  I have discussed the care of [facility], including pertinent history and exam findings,  with the resident. I have seen and examined the patient and the key elements of all parts of the encounter have been performed by me. I agree with the assessment, plan and orders as documented by the resident with additions . een in ER    Treatment plan Discussed with nursing staff in detail , all questions answered . Electronically signed by Mitchell Costa MD on   9/2/20 at 9:59 PM EDT    Please note that this chart was generated using voice recognition Dragon dictation software. Although every effort was made to ensure the accuracy of this automated transcription, some errors in transcription may have occurred.

## 2020-09-03 ENCOUNTER — APPOINTMENT (OUTPATIENT)
Dept: CT IMAGING | Age: 65
DRG: 917 | End: 2020-09-03
Payer: COMMERCIAL

## 2020-09-03 PROBLEM — R55 SYNCOPE: Status: ACTIVE | Noted: 2020-09-03

## 2020-09-03 PROBLEM — I21.4 NSTEMI (NON-ST ELEVATED MYOCARDIAL INFARCTION) (HCC): Status: ACTIVE | Noted: 2020-09-03

## 2020-09-03 PROBLEM — F19.10 POLYSUBSTANCE ABUSE (HCC): Status: ACTIVE | Noted: 2020-09-03

## 2020-09-03 PROBLEM — F14.10 COCAINE ABUSE (HCC): Status: ACTIVE | Noted: 2020-09-03

## 2020-09-03 LAB
ABSOLUTE EOS #: 0.08 K/UL (ref 0–0.44)
ABSOLUTE IMMATURE GRANULOCYTE: <0.03 K/UL (ref 0–0.3)
ABSOLUTE LYMPH #: 1.23 K/UL (ref 1.1–3.7)
ABSOLUTE MONO #: 0.45 K/UL (ref 0.1–1.2)
ALBUMIN SERPL-MCNC: 2.9 G/DL (ref 3.5–5.2)
ALBUMIN/GLOBULIN RATIO: 1.1 (ref 1–2.5)
ALP BLD-CCNC: 79 U/L (ref 40–129)
ALT SERPL-CCNC: 40 U/L (ref 5–41)
ANION GAP SERPL CALCULATED.3IONS-SCNC: 12 MMOL/L (ref 9–17)
AST SERPL-CCNC: 42 U/L
BASOPHILS # BLD: 1 % (ref 0–2)
BASOPHILS ABSOLUTE: 0.03 K/UL (ref 0–0.2)
BILIRUB SERPL-MCNC: 0.46 MG/DL (ref 0.3–1.2)
BILIRUBIN DIRECT: 0.16 MG/DL
BILIRUBIN, INDIRECT: 0.3 MG/DL (ref 0–1)
BUN BLDV-MCNC: 16 MG/DL (ref 8–23)
BUN/CREAT BLD: ABNORMAL (ref 9–20)
CALCIUM SERPL-MCNC: 8 MG/DL (ref 8.6–10.4)
CHLORIDE BLD-SCNC: 109 MMOL/L (ref 98–107)
CO2: 22 MMOL/L (ref 20–31)
CREAT SERPL-MCNC: 0.88 MG/DL (ref 0.7–1.2)
DIFFERENTIAL TYPE: ABNORMAL
EOSINOPHILS RELATIVE PERCENT: 1 % (ref 1–4)
GFR AFRICAN AMERICAN: >60 ML/MIN
GFR NON-AFRICAN AMERICAN: >60 ML/MIN
GFR SERPL CREATININE-BSD FRML MDRD: ABNORMAL ML/MIN/{1.73_M2}
GFR SERPL CREATININE-BSD FRML MDRD: ABNORMAL ML/MIN/{1.73_M2}
GLOBULIN: ABNORMAL G/DL (ref 1.5–3.8)
GLUCOSE BLD-MCNC: 95 MG/DL (ref 70–99)
HCT VFR BLD CALC: 39.7 % (ref 40.7–50.3)
HEMOGLOBIN: 12.9 G/DL (ref 13–17)
IMMATURE GRANULOCYTES: 0 %
LV EF: 65 %
LVEF MODALITY: NORMAL
LYMPHOCYTES # BLD: 22 % (ref 24–43)
MCH RBC QN AUTO: 31.6 PG (ref 25.2–33.5)
MCHC RBC AUTO-ENTMCNC: 32.5 G/DL (ref 28.4–34.8)
MCV RBC AUTO: 97.3 FL (ref 82.6–102.9)
MONOCYTES # BLD: 8 % (ref 3–12)
NRBC AUTOMATED: 0 PER 100 WBC
PARTIAL THROMBOPLASTIN TIME: 35 SEC (ref 20.5–30.5)
PARTIAL THROMBOPLASTIN TIME: 36 SEC (ref 20.5–30.5)
PARTIAL THROMBOPLASTIN TIME: 46.8 SEC (ref 20.5–30.5)
PDW BLD-RTO: 13.3 % (ref 11.8–14.4)
PLATELET # BLD: 78 K/UL (ref 138–453)
PLATELET ESTIMATE: ABNORMAL
PMV BLD AUTO: 12.2 FL (ref 8.1–13.5)
POTASSIUM SERPL-SCNC: 4.2 MMOL/L (ref 3.7–5.3)
RBC # BLD: 4.08 M/UL (ref 4.21–5.77)
RBC # BLD: ABNORMAL 10*6/UL
SEG NEUTROPHILS: 68 % (ref 36–65)
SEGMENTED NEUTROPHILS ABSOLUTE COUNT: 3.78 K/UL (ref 1.5–8.1)
SODIUM BLD-SCNC: 143 MMOL/L (ref 135–144)
TOTAL PROTEIN: 5.6 G/DL (ref 6.4–8.3)
TROPONIN INTERP: ABNORMAL
TROPONIN INTERP: ABNORMAL
TROPONIN INTERP: NORMAL
TROPONIN T: ABNORMAL NG/ML
TROPONIN T: ABNORMAL NG/ML
TROPONIN T: NORMAL NG/ML
TROPONIN, HIGH SENSITIVITY: 19 NG/L (ref 0–22)
TROPONIN, HIGH SENSITIVITY: 23 NG/L (ref 0–22)
TROPONIN, HIGH SENSITIVITY: 25 NG/L (ref 0–22)
WBC # BLD: 5.6 K/UL (ref 3.5–11.3)
WBC # BLD: ABNORMAL 10*3/UL

## 2020-09-03 PROCEDURE — 71250 CT THORAX DX C-: CPT

## 2020-09-03 PROCEDURE — 2060000000 HC ICU INTERMEDIATE R&B

## 2020-09-03 PROCEDURE — 6360000002 HC RX W HCPCS: Performed by: STUDENT IN AN ORGANIZED HEALTH CARE EDUCATION/TRAINING PROGRAM

## 2020-09-03 PROCEDURE — 85025 COMPLETE CBC W/AUTO DIFF WBC: CPT

## 2020-09-03 PROCEDURE — 94640 AIRWAY INHALATION TREATMENT: CPT

## 2020-09-03 PROCEDURE — 6370000000 HC RX 637 (ALT 250 FOR IP): Performed by: STUDENT IN AN ORGANIZED HEALTH CARE EDUCATION/TRAINING PROGRAM

## 2020-09-03 PROCEDURE — 93306 TTE W/DOPPLER COMPLETE: CPT

## 2020-09-03 PROCEDURE — 2700000000 HC OXYGEN THERAPY PER DAY

## 2020-09-03 PROCEDURE — 80048 BASIC METABOLIC PNL TOTAL CA: CPT

## 2020-09-03 PROCEDURE — 99232 SBSQ HOSP IP/OBS MODERATE 35: CPT | Performed by: INTERNAL MEDICINE

## 2020-09-03 PROCEDURE — 84484 ASSAY OF TROPONIN QUANT: CPT

## 2020-09-03 PROCEDURE — 80076 HEPATIC FUNCTION PANEL: CPT

## 2020-09-03 PROCEDURE — 2580000003 HC RX 258: Performed by: STUDENT IN AN ORGANIZED HEALTH CARE EDUCATION/TRAINING PROGRAM

## 2020-09-03 PROCEDURE — 85730 THROMBOPLASTIN TIME PARTIAL: CPT

## 2020-09-03 PROCEDURE — 36415 COLL VENOUS BLD VENIPUNCTURE: CPT

## 2020-09-03 RX ORDER — IPRATROPIUM BROMIDE AND ALBUTEROL SULFATE 2.5; .5 MG/3ML; MG/3ML
1 SOLUTION RESPIRATORY (INHALATION) 4 TIMES DAILY
Status: DISCONTINUED | OUTPATIENT
Start: 2020-09-03 | End: 2020-09-04 | Stop reason: HOSPADM

## 2020-09-03 RX ADMIN — ATORVASTATIN CALCIUM 40 MG: 80 TABLET, FILM COATED ORAL at 20:31

## 2020-09-03 RX ADMIN — IPRATROPIUM BROMIDE AND ALBUTEROL SULFATE 1 AMPULE: .5; 3 SOLUTION RESPIRATORY (INHALATION) at 15:09

## 2020-09-03 RX ADMIN — SODIUM CHLORIDE, PRESERVATIVE FREE 10 ML: 5 INJECTION INTRAVENOUS at 20:32

## 2020-09-03 RX ADMIN — HEPARIN SODIUM 2000 UNITS: 1000 INJECTION INTRAVENOUS; SUBCUTANEOUS at 06:21

## 2020-09-03 RX ADMIN — IPRATROPIUM BROMIDE AND ALBUTEROL SULFATE 1 AMPULE: .5; 3 SOLUTION RESPIRATORY (INHALATION) at 20:11

## 2020-09-03 RX ADMIN — HEPARIN SODIUM 2000 UNITS: 1000 INJECTION INTRAVENOUS; SUBCUTANEOUS at 00:52

## 2020-09-03 RX ADMIN — ASPIRIN 81 MG: 81 TABLET ORAL at 13:43

## 2020-09-03 ASSESSMENT — PAIN SCALES - GENERAL
PAINLEVEL_OUTOF10: 0
PAINLEVEL_OUTOF10: 0

## 2020-09-03 NOTE — PROGRESS NOTES
Physical Therapy  DATE: 9/3/2020    NAME: Mat Moore  MRN: [id number]   : 1955    Patient not seen this date for Physical Therapy due to:  [] Blood transfusion in progress  [] Hemodialysis  []  Patient Declined  [] Spine Precautions   [] Strict Bedrest  [] Surgery/ Procedure  [] Testing      [] Other        [x] PT being discontinued at this time. Patient independent. No further needs. [] PT being discontinued at this time as the patient has been transferred to palliative care. No further needs.     Faustino Dawn, PT

## 2020-09-03 NOTE — PLAN OF CARE
Problem: Cardiac:  Goal: Ability to maintain an adequate cardiac output will improve  Description: Ability to maintain an adequate cardiac output will improve  Outcome: Ongoing  Goal: Hemodynamic stability will improve  Description: Hemodynamic stability will improve  Outcome: Ongoing     Problem: Fluid Volume:  Goal: Ability to achieve and maintain adequate urine output will improve  Description: Ability to achieve and maintain adequate urine output will improve  Outcome: Ongoing     Problem: Respiratory:  Goal: Respiratory status will improve  Description: Respiratory status will improve  Outcome: Ongoing     Problem: Falls - Risk of:  Goal: Will remain free from falls  Description: Will remain free from falls  Outcome: Ongoing  Goal: Absence of physical injury  Description: Absence of physical injury  Outcome: Ongoing  Electronically signed by Flora Salinas RN on 9/3/2020 at 5:49 PM

## 2020-09-03 NOTE — CONSULTS
[facility]    Inpatient Consultation Note               Today's Date: 9/3/2020  Patient Name: Melissa Aguilar  Date of admission: 9/2/2020  4:55 AM  Patient's age: 63 y.o., 1955  Admission Dx: Elevated troponin [R79.89]  Elevated troponin [R79.89]    Reason for  Consult:  Troponin elevation (NSTEMI vs demand vs vasospasm)    Requesting Physician: Herbert Hanna MD    CHIEF COMPLAINT:  AMS  Chief Complaint   Patient presents with    Seizures       History Obtained From:  patient, electronic medical record    HISTORY OF PRESENT ILLNESS:      The patient is a 63 y.o. male who is admitted to the hospital for AMS. Per reports patient was found to be unresponsive at home. EMS was called who gave a dose of narcan on the way to the hospital. At that point the patients mentation improved and he was noted to be A&Ox3. In the ED, Vitals were stable. Troponin noted to be elevated to 32-->58-->67. EKG showed NSR. Utox positive for cannibus and cocaine. Patient started on heparin gtt and given asa 324. Cardiology consulted for elevated troponin. Reports no chest pain, palpitations, sob, orthopnea, PND or LE edema. Reports no specific complaints. Of note patient has a history of polysubstance abuse and has no known PMH as he has not seen a physician in years. Past Medical History:   has no past medical history on file. Past Surgical History:   has no past surgical history on file.      Home Medications:    Prior to Admission medications    Not on File        Current Facility-Administered Medications: sodium chloride flush 0.9 % injection 10 mL, 10 mL, Intravenous, 2 times per day  sodium chloride flush 0.9 % injection 10 mL, 10 mL, Intravenous, PRN  acetaminophen (TYLENOL) tablet 650 mg, 650 mg, Oral, Q4H PRN  heparin (porcine) injection 4,000 Units, 4,000 Units, Intravenous, PRN  heparin (porcine) injection 2,000 Units, 2,000 Units, Intravenous, PRN  heparin 25,000 units in dextrose 5% 250 mL S1 and S2.   · No murmurs  Abdomen:   · No masses or tenderness  · Bowel sounds present  Extremities:  ·  No Cyanosis or Clubbing  ·  Lower extremity edema: No  Neurological:  · Alert and oriented. · Moves all extremities well    DATA:    Diagnostics:    EKG:   NSR  ECHO:    pending    Labs:     CBC:   Recent Labs     09/02/20  0956 09/03/20  0507   WBC 6.8 5.6   HGB 12.7* 12.9*   HCT 39.5* 39.7*   PLT See Reflexed IPF Result 78*     BMP:   Recent Labs     09/02/20  0527 09/03/20  0507    143   K 3.9 4.2   CO2 22 22   BUN 19 16   CREATININE 1.20 0.88   LABGLOM >60 >60   GLUCOSE 157* 95     Pro-BNP:  No results for input(s): PROBNP in the last 72 hours. BNP: No results for input(s): BNP in the last 72 hours. PT/INR: No results for input(s): PROTIME, INR in the last 72 hours. APTT:  Recent Labs     09/03/20  0019 09/03/20  0507   APTT 35.0* 36.0*     CARDIAC ENZYMES:No results for input(s): CKTOTAL, CKMB, CKMBINDEX, TROPONINI in the last 72 hours. Invalid input(s):  [address]  Recent Labs     09/02/20  1924 09/03/20  0019 09/03/20  0507   TROPONINT NOT REPORTED NOT REPORTED NOT REPORTED       FASTING LIPID PANEL:  Lab Results   Component Value Date    HDL 27 09/02/2020    TRIG 70 09/02/2020     LIVER PROFILE:  Recent Labs     09/02/20  0527 09/03/20  0507   AST 71* 42*   ALT 58* 40   LABALBU 3.4* 2.9*         Patient's Active Problem List  Active Problems:    Elevated troponin  Resolved Problems:    * No resolved hospital problems. *        IMPRESSION:    1. Troponin elevation: NSTEMI vs demand ischemia vs. Coronary vasospasm in setting of positive cocaine on UTox- Patient denies any anginal symptoms  2. AMS-->resolved after Narcan  3. Polysubstance abuse    RECOMMENDATIONS:  1. Trend troponins  2. Continue heparin gtt  3. Continue asa  4. 2D ECHO pending. Will follow up on read  5. K>4, Mg>2    Thank you for allowing us to participate in the care of Lisa Diver.  If you have any questions or concerns, please do not hesitate to contact us. Discussed with patient and Nurse. Linda Laguna M.D. Fellow, [address] [facility]        Please note that part of this chart were generated using voice recognition  dictation software. Although every effort was made to ensure the accuracy of this automated transcription, some errors in transcription may have occurred. Attestation signed by      Attending Physician Statement:    I have discussed the care of  [facility] , including pertinent history and exam findings, with the Cardiology fellow/resident. I have seen and examined the patient and the key elements of all parts of the encounter have been performed by me. I agree with the assessment, plan and orders as documented by the fellow/resident, after I modified exam findings and plan of treatments, and the final version is my approved version of the assessment. Additional Comments: Patient was found unresponsive and woke up with narcan. Utox + for cocaine and cannabis. Cardiology consulted for elevated HS trop 38-67-23. TTE showed preserved LV systolic function with LVEF 65%. Patient denies any chest pain or dyspnea. He reports being active and work for 10 hours. Will obtain lexiscan stress test for risk stratification. Continue ASA. Ok to stop IV heparin.     Lovett Collet, MD

## 2020-09-03 NOTE — PROGRESS NOTES
Page sent to cardiology, patient requesting to eat.   Electronically signed by Milvia Peacock RN on 9/3/2020 at 10:30 AM

## 2020-09-03 NOTE — PROGRESS NOTES
[facility]  Internal Medicine Teaching Residency Program  Inpatient Daily Progress Note  ______________________________________________________________________________    Patient: Lamin Wilson  YOB: 1955   NARCISO:[id number]    Acct: [de-identified]     Room: 2001/2001-01  Admit date: 9/2/2020  Today's date: 09/03/20  Number of days in the hospital: 1    SUBJECTIVE   Admitting Diagnosis: NSTEMI (non-ST elevated [facility]) ([facility]  CC: altered mental status/syncope  Pt examined at bedside. Chart & results reviewed. No new complaints. Vitals stable. Hba1c 5.6  Low HDL. Trops downtrending  Stopped heparin    ROS:  Constitutional:  negative for chills, fevers, sweats  Respiratory:  negative for cough, dyspnea on exertion, hemoptysis, shortness of breath, wheezing  Cardiovascular:  negative for chest pain, chest pressure/discomfort, lower extremity edema, palpitations  Gastrointestinal:  negative for abdominal pain, constipation, diarrhea, nausea, vomiting  Neurological:  negative for dizziness, headache  BRIEF HISTORY     The patient is a pleasant 63 y.o. male presents with a chief complaint of  altered mental status . He was found unresponsive at home, EMS was called in. No seizure like activity noted. Patient doesn't recall any of those events and said that all he knows he was watching netflix and he just found himself in ambulance after a while. He had no aura,dizziness,diaphoresis or any weird feeling before he passed out. No chest pain, shortness of breath, funny sensation in heart . No previous similar episodes. No previous cardiac history or seizure history. Doesn't take any medications except aspirin for headaches which occur maybe twice a year. Doesn't follow up with any doctor. Doesn't have any flu like symptoms, any pain, fever,urinary or bowel complaints. No nausea or vomiting.   No recent injury or trauma or fall.   he does have history of narcotic,opiod use, denies cocaine use     His pupils were pinpoint and  1 narcan was given on way to hospital     On my visit in ED, patient is awake and alert, pupils normal  Afebrile,vitals stable,saturating well on room air. Extensive track marks on both arms,presumably from patient's history of IV drug abuse. troponins rising,32>58>67, aspirin ordered,cardiology consulted. Patient started on heparin. Normal sinus rhythm on EKG, no ischemic changes  CT head negative for any acute abnormality. CXR- mild vascular congestion,mild bibasilar atelactasis  Blood tox negative. Glucose 157,Hb-10.7  ALT-58,AST-71  Ammonia mildly elevated-80 ,no flapping asterixis  U tox positive for cannabinoids and cocaine. Urine analysis negative for infection. OBJECTIVE     Vital Signs:  /63   Pulse 66   Temp 97.6 °F (36.4 °C) (Oral)   Resp 14   Ht 6' (1.829 m)   Wt 169 lb 14.4 oz (77.1 kg)   SpO2 94%   BMI 23.04 kg/m²     Temp (24hrs), Av.1 °F (36.7 °C), Min:97.6 °F (36.4 °C), Max:98.6 °F (37 °C)    In: 178.5   Out: -     Physical Exam:  Constitutional: This is a well developed, well nourished, 18.5-24.9 - Normal 63y.o. year old male who is alert, oriented, cooperative and in no apparent distress. Head:normocephalic and atraumatic. EENT:  PERRLA. No conjunctival injections. Septum was midline, mucosa was without erythema, exudates or cobblestoning. No thrush was noted. Neck: Supple without thyromegaly. No elevated JVP. Trachea was midline. Respiratory: Chest was symmetrical without dullness to percussion. Breath sounds bilaterally were clear to auscultation. There were no wheezes, rhonchi or rales. There is no intercostal retraction or use of accessory muscles. No egophony noted. Cardiovascular: Regular without murmur, clicks, gallops or rubs. Abdomen: Slightly rounded and soft without organomegaly. No rebound, rigidity or guarding was appreciated.     Lymphatic: No lymphadenopathy. Musculoskeletal: Normal curvature of the spine. No gross muscle weakness. Extremities:  No lower extremity edema, ulcerations, tenderness, varicosities or erythema. Muscle size, tone and strength are normal.  No involuntary movements are noted. Skin:  Warm and dry. Good color, turgor and pigmentation. No lesions or scars. No cyanosis or clubbing  Neurological/Psychiatric: The patient's general behavior, level of consciousness, thought content and emotional status is normal.        Medications:  Scheduled Medications:    ipratropium-albuterol  1 ampule Inhalation 4x daily    sodium chloride flush  10 mL Intravenous 2 times per day    sodium chloride flush  10 mL Intravenous 2 times per day    aspirin  81 mg Oral Daily    atorvastatin  40 mg Oral Nightly     Continuous Infusions:    heparin (porcine) 17.98 Units/kg/hr (09/03/20 0622)     PRN Medicationssodium chloride flush, 10 mL, PRN  acetaminophen, 650 mg, Q4H PRN  heparin (porcine), 4,000 Units, PRN  heparin (porcine), 2,000 Units, PRN  sodium chloride flush, 10 mL, PRN  acetaminophen, 650 mg, Q6H PRN    Or  acetaminophen, 650 mg, Q6H PRN  polyethylene glycol, 17 g, Daily PRN  promethazine, 12.5 mg, Q6H PRN    Or  ondansetron, 4 mg, Q6H PRN        Diagnostic Labs:  CBC:   Recent Labs     09/02/20  0527 09/02/20  0956 09/03/20  0507   WBC 9.5 6.8 5.6   RBC 4.54 4.06* 4.08*   HGB 14.5 12.7* 12.9*   HCT 44.5 39.5* 39.7*   MCV 98.0 97.3 97.3   RDW 13.2 13.2 13.3   * See Reflexed IPF Result 78*     BMP:   Recent Labs     09/02/20  0527 09/03/20  0507    143   K 3.9 4.2    109*   CO2 22 22   BUN 19 16   CREATININE 1.20 0.88     BNP: No results for input(s): BNP in the last 72 hours. PT/INR: No results for input(s): PROTIME, INR in the last 72 hours.   APTT:   Recent Labs     09/02/20  1600 09/03/20  0019 09/03/20  0507   APTT 35.0* 35.0* 36.0*     CARDIAC ENZYMES: No results for input(s): CKMB, CKMBINDEX, TROPONINI in the last 72 hours. Invalid input(s): CKTOTAL;3  FASTING LIPID PANEL:  Lab Results   Component Value Date    CHOL 89 09/02/2020    HDL 27 (L) 09/02/2020    TRIG 70 09/02/2020     LIVER PROFILE:   Recent Labs     09/02/20  0527 09/03/20  0507   AST 71* 42*   ALT 58* 40   BILIDIR  --  0.16   BILITOT 0.28* 0.46   ALKPHOS 104 79      MICROBIOLOGY: No results found for: CULTURE    Imaging:    Ct Head Wo Contrast    Result Date: 9/2/2020  No acute intracranial abnormality. Xr Chest Portable    Result Date: 9/2/2020  Expiratory exam with mild prominence of the pulmonary vascularity/interstitial markings, findings suggest mild vascular congestion Mild streaky bibasilar atelectasis       ASSESSMENT & PLAN     IMPRESSION  This is a 63 y.o. male who presented with altered mental status.      Active Problems:     Altered mental status/ drug induced encephalopathy  Possibly because of drug overdose,received 1 Narcan before coming to hospital  Utox positive for cannabinoids and cocaine. Ammonia mildly elevated-80 ,no flapping asterixis  Will continue to monitor.      troponemia- downtrending  60>50>47>33  Plan: on heparin,cardiology consulted  Started on aspirin and statin. Patient NPO after midnight. F/u with Echo         lipid panel- HDL low otherwise normal   and HbA1c- 5.6     DVT ppx: already on heparin  GI ppx: not indicated     PT/OT/SW: ongoing  Discharge Darnell Rodas MD  Internal Medicine Resident, PGY-1  [address] [place], [place]  9/3/2020, 11:41 AM   Attending Physician Statement  I have discussed the care of Jennifer Powers, including pertinent history and exam findings,  with the resident. I have seen and examined the patient and the key elements of all parts of the encounter have been performed by me. I agree with the assessment, plan and orders as documented by the resident with additions .       Treatment plan Discussed with nursing staff in detail , all questions answered . Electronically signed by Carlos Eduardo Cardoza MD on   9/3/20 at 5:49 PM EDT    Please note that this chart was generated using voice recognition Dragon dictation software. Although every effort was made to ensure the accuracy of this automated transcription, some errors in transcription may have occurred.

## 2020-09-03 NOTE — PROGRESS NOTES
Occupational Therapy Not Seen Note    DATE: 9/3/2020  Name: Cassie Ram  : 1955  MRN: [id number]    Patient not available for Occupational Therapy due to:    Pt independent with functional mobility and functional tasks. Pt with no OT acute care needs at this time, will defer OT eval. Pt reports pt at baseline functional level, IND with ADL/ functional activities and functional transfers/ mobility. Pt denies balance or safety concerns for discharge.  Pt educated in ability to re-order therapy if functional status changes    Next Scheduled Treatment: Defer OT evaluation    Electronically signed by BOUCHRA Stephenson on 9/3/2020 at 10:39 AM

## 2020-09-04 ENCOUNTER — APPOINTMENT (OUTPATIENT)
Dept: NUCLEAR MEDICINE | Age: 65
DRG: 917 | End: 2020-09-04
Payer: COMMERCIAL

## 2020-09-04 VITALS
HEIGHT: 72 IN | BODY MASS INDEX: 22.9 KG/M2 | WEIGHT: 169.1 LBS | RESPIRATION RATE: 18 BRPM | OXYGEN SATURATION: 99 % | TEMPERATURE: 97.3 F | SYSTOLIC BLOOD PRESSURE: 114 MMHG | HEART RATE: 86 BPM | DIASTOLIC BLOOD PRESSURE: 50 MMHG

## 2020-09-04 LAB
ABSOLUTE EOS #: 0.04 K/UL (ref 0–0.44)
ABSOLUTE IMMATURE GRANULOCYTE: <0.03 K/UL (ref 0–0.3)
ABSOLUTE LYMPH #: 0.82 K/UL (ref 1.1–3.7)
ABSOLUTE MONO #: 0.31 K/UL (ref 0.1–1.2)
ANION GAP SERPL CALCULATED.3IONS-SCNC: 9 MMOL/L (ref 9–17)
BASOPHILS # BLD: 1 % (ref 0–2)
BASOPHILS ABSOLUTE: <0.03 K/UL (ref 0–0.2)
BUN BLDV-MCNC: 14 MG/DL (ref 8–23)
BUN/CREAT BLD: ABNORMAL (ref 9–20)
CALCIUM SERPL-MCNC: 8.1 MG/DL (ref 8.6–10.4)
CHLORIDE BLD-SCNC: 111 MMOL/L (ref 98–107)
CO2: 22 MMOL/L (ref 20–31)
CREAT SERPL-MCNC: 0.9 MG/DL (ref 0.7–1.2)
DIFFERENTIAL TYPE: ABNORMAL
EKG ATRIAL RATE: 67 BPM
EKG ATRIAL RATE: 77 BPM
EKG P AXIS: 68 DEGREES
EKG P AXIS: 74 DEGREES
EKG P-R INTERVAL: 142 MS
EKG P-R INTERVAL: 142 MS
EKG Q-T INTERVAL: 408 MS
EKG Q-T INTERVAL: 432 MS
EKG QRS DURATION: 70 MS
EKG QRS DURATION: 76 MS
EKG QTC CALCULATION (BAZETT): 456 MS
EKG QTC CALCULATION (BAZETT): 461 MS
EKG R AXIS: 41 DEGREES
EKG R AXIS: 54 DEGREES
EKG T AXIS: 39 DEGREES
EKG T AXIS: 40 DEGREES
EKG VENTRICULAR RATE: 67 BPM
EKG VENTRICULAR RATE: 77 BPM
EOSINOPHILS RELATIVE PERCENT: 1 % (ref 1–4)
GFR AFRICAN AMERICAN: >60 ML/MIN
GFR NON-AFRICAN AMERICAN: >60 ML/MIN
GFR SERPL CREATININE-BSD FRML MDRD: ABNORMAL ML/MIN/{1.73_M2}
GFR SERPL CREATININE-BSD FRML MDRD: ABNORMAL ML/MIN/{1.73_M2}
GLUCOSE BLD-MCNC: 111 MG/DL (ref 70–99)
HCT VFR BLD CALC: 37.3 % (ref 40.7–50.3)
HEMOGLOBIN: 12.5 G/DL (ref 13–17)
IMMATURE GRANULOCYTES: 0 %
LV EF: 70 %
LVEF MODALITY: NORMAL
LYMPHOCYTES # BLD: 21 % (ref 24–43)
MAGNESIUM: 2.1 MG/DL (ref 1.6–2.6)
MCH RBC QN AUTO: 31.8 PG (ref 25.2–33.5)
MCHC RBC AUTO-ENTMCNC: 33.5 G/DL (ref 28.4–34.8)
MCV RBC AUTO: 94.9 FL (ref 82.6–102.9)
MONOCYTES # BLD: 8 % (ref 3–12)
NRBC AUTOMATED: 0 PER 100 WBC
PDW BLD-RTO: 13 % (ref 11.8–14.4)
PLATELET # BLD: 82 K/UL (ref 138–453)
PLATELET ESTIMATE: ABNORMAL
PMV BLD AUTO: 11.6 FL (ref 8.1–13.5)
POTASSIUM SERPL-SCNC: 3.7 MMOL/L (ref 3.7–5.3)
RBC # BLD: 3.93 M/UL (ref 4.21–5.77)
RBC # BLD: ABNORMAL 10*6/UL
SEG NEUTROPHILS: 69 % (ref 36–65)
SEGMENTED NEUTROPHILS ABSOLUTE COUNT: 2.67 K/UL (ref 1.5–8.1)
SODIUM BLD-SCNC: 142 MMOL/L (ref 135–144)
WBC # BLD: 3.9 K/UL (ref 3.5–11.3)
WBC # BLD: ABNORMAL 10*3/UL

## 2020-09-04 PROCEDURE — 3430000000 HC RX DIAGNOSTIC RADIOPHARMACEUTICAL: Performed by: INTERNAL MEDICINE

## 2020-09-04 PROCEDURE — 94640 AIRWAY INHALATION TREATMENT: CPT

## 2020-09-04 PROCEDURE — 6370000000 HC RX 637 (ALT 250 FOR IP): Performed by: STUDENT IN AN ORGANIZED HEALTH CARE EDUCATION/TRAINING PROGRAM

## 2020-09-04 PROCEDURE — 93017 CV STRESS TEST TRACING ONLY: CPT

## 2020-09-04 PROCEDURE — 78452 HT MUSCLE IMAGE SPECT MULT: CPT

## 2020-09-04 PROCEDURE — A9500 TC99M SESTAMIBI: HCPCS | Performed by: INTERNAL MEDICINE

## 2020-09-04 PROCEDURE — 99238 HOSP IP/OBS DSCHRG MGMT 30/<: CPT | Performed by: INTERNAL MEDICINE

## 2020-09-04 PROCEDURE — 94761 N-INVAS EAR/PLS OXIMETRY MLT: CPT

## 2020-09-04 PROCEDURE — 2580000003 HC RX 258: Performed by: STUDENT IN AN ORGANIZED HEALTH CARE EDUCATION/TRAINING PROGRAM

## 2020-09-04 PROCEDURE — 83735 ASSAY OF MAGNESIUM: CPT

## 2020-09-04 PROCEDURE — 85025 COMPLETE CBC W/AUTO DIFF WBC: CPT

## 2020-09-04 PROCEDURE — 6360000002 HC RX W HCPCS: Performed by: INTERNAL MEDICINE

## 2020-09-04 PROCEDURE — 36415 COLL VENOUS BLD VENIPUNCTURE: CPT

## 2020-09-04 PROCEDURE — 80048 BASIC METABOLIC PNL TOTAL CA: CPT

## 2020-09-04 PROCEDURE — 2580000003 HC RX 258: Performed by: INTERNAL MEDICINE

## 2020-09-04 RX ORDER — NITROGLYCERIN 0.4 MG/1
0.4 TABLET SUBLINGUAL EVERY 5 MIN PRN
Status: DISCONTINUED | OUTPATIENT
Start: 2020-09-04 | End: 2020-09-04 | Stop reason: ALTCHOICE

## 2020-09-04 RX ORDER — SODIUM CHLORIDE 0.9 % (FLUSH) 0.9 %
10 SYRINGE (ML) INJECTION PRN
Status: DISCONTINUED | OUTPATIENT
Start: 2020-09-04 | End: 2020-09-04 | Stop reason: HOSPADM

## 2020-09-04 RX ORDER — AMINOPHYLLINE DIHYDRATE 25 MG/ML
50 INJECTION, SOLUTION INTRAVENOUS PRN
Status: DISCONTINUED | OUTPATIENT
Start: 2020-09-04 | End: 2020-09-04 | Stop reason: ALTCHOICE

## 2020-09-04 RX ORDER — ASPIRIN 81 MG/1
81 TABLET, CHEWABLE ORAL DAILY
Qty: 30 TABLET | Refills: 3 | Status: SHIPPED | OUTPATIENT
Start: 2020-09-04

## 2020-09-04 RX ORDER — IPRATROPIUM BROMIDE AND ALBUTEROL SULFATE 2.5; .5 MG/3ML; MG/3ML
3 SOLUTION RESPIRATORY (INHALATION) EVERY 4 HOURS PRN
Qty: 126 ML | Refills: 0 | Status: SHIPPED | OUTPATIENT
Start: 2020-09-04 | End: 2020-09-28

## 2020-09-04 RX ORDER — SODIUM CHLORIDE 9 MG/ML
500 INJECTION, SOLUTION INTRAVENOUS CONTINUOUS PRN
Status: DISCONTINUED | OUTPATIENT
Start: 2020-09-04 | End: 2020-09-04 | Stop reason: ALTCHOICE

## 2020-09-04 RX ORDER — METOPROLOL TARTRATE 5 MG/5ML
5 INJECTION INTRAVENOUS EVERY 5 MIN PRN
Status: DISCONTINUED | OUTPATIENT
Start: 2020-09-04 | End: 2020-09-04 | Stop reason: ALTCHOICE

## 2020-09-04 RX ORDER — SODIUM CHLORIDE 0.9 % (FLUSH) 0.9 %
10 SYRINGE (ML) INJECTION PRN
Status: DISCONTINUED | OUTPATIENT
Start: 2020-09-04 | End: 2020-09-04 | Stop reason: ALTCHOICE

## 2020-09-04 RX ORDER — ATORVASTATIN CALCIUM 40 MG/1
40 TABLET, FILM COATED ORAL NIGHTLY
Qty: 30 TABLET | Refills: 3 | Status: SHIPPED | OUTPATIENT
Start: 2020-09-04

## 2020-09-04 RX ORDER — ATROPINE SULFATE 0.1 MG/ML
0.5 INJECTION INTRAVENOUS EVERY 5 MIN PRN
Status: DISCONTINUED | OUTPATIENT
Start: 2020-09-04 | End: 2020-09-04 | Stop reason: ALTCHOICE

## 2020-09-04 RX ADMIN — IPRATROPIUM BROMIDE AND ALBUTEROL SULFATE 1 AMPULE: .5; 3 SOLUTION RESPIRATORY (INHALATION) at 15:06

## 2020-09-04 RX ADMIN — SODIUM CHLORIDE, PRESERVATIVE FREE 10 ML: 5 INJECTION INTRAVENOUS at 08:43

## 2020-09-04 RX ADMIN — SODIUM CHLORIDE, PRESERVATIVE FREE 10 ML: 5 INJECTION INTRAVENOUS at 10:10

## 2020-09-04 RX ADMIN — REGADENOSON 0.4 MG: 0.08 INJECTION, SOLUTION INTRAVENOUS at 10:08

## 2020-09-04 RX ADMIN — TETRAKIS(2-METHOXYISOBUTYLISOCYANIDE)COPPER(I) TETRAFLUOROBORATE 15.3 MILLICURIE: 1 INJECTION, POWDER, LYOPHILIZED, FOR SOLUTION INTRAVENOUS at 08:13

## 2020-09-04 RX ADMIN — Medication 10 ML: at 10:08

## 2020-09-04 RX ADMIN — SODIUM CHLORIDE, PRESERVATIVE FREE 10 ML: 5 INJECTION INTRAVENOUS at 08:42

## 2020-09-04 RX ADMIN — TETRAKIS(2-METHOXYISOBUTYLISOCYANIDE)COPPER(I) TETRAFLUOROBORATE 38.3 MILLICURIE: 1 INJECTION, POWDER, LYOPHILIZED, FOR SOLUTION INTRAVENOUS at 10:10

## 2020-09-04 RX ADMIN — IPRATROPIUM BROMIDE AND ALBUTEROL SULFATE 1 AMPULE: .5; 3 SOLUTION RESPIRATORY (INHALATION) at 07:39

## 2020-09-04 RX ADMIN — POTASSIUM BICARBONATE 40 MEQ: 782 TABLET, EFFERVESCENT ORAL at 14:01

## 2020-09-04 RX ADMIN — Medication 10 ML: at 09:47

## 2020-09-04 RX ADMIN — SODIUM CHLORIDE, PRESERVATIVE FREE 10 ML: 5 INJECTION INTRAVENOUS at 08:13

## 2020-09-04 RX ADMIN — IPRATROPIUM BROMIDE AND ALBUTEROL SULFATE 1 AMPULE: .5; 3 SOLUTION RESPIRATORY (INHALATION) at 11:50

## 2020-09-04 NOTE — PROGRESS NOTES
symptoms, any pain, fever,urinary or bowel complaints. No nausea or vomiting. No recent injury or trauma or fall.   he does have history of narcotic,opiod use, denies cocaine use     His pupils were pinpoint and  1 narcan was given on way to hospital     On my visit in ED, patient is awake and alert, pupils normal  Afebrile,vitals stable,saturating well on room air. Extensive track marks on both arms,presumably from patient's history of IV drug abuse. troponins rising,32>58>67, aspirin ordered,cardiology consulted. Patient started on heparin. Normal sinus rhythm on EKG, no ischemic changes  CT head negative for any acute abnormality. CXR- mild vascular congestion,mild bibasilar atelactasis  Blood tox negative. Glucose 157,Hb-10.7  ALT-58,AST-71  Ammonia mildly elevated-80 ,no flapping asterixis  U tox positive for cannabinoids and cocaine. Urine analysis negative for infection. OBJECTIVE     Vital Signs:  /60   Pulse 91   Temp 97.3 °F (36.3 °C) (Oral)   Resp 20   Ht 6' (1.829 m)   Wt 169 lb 1.6 oz (76.7 kg)   SpO2 95%   BMI 22.93 kg/m²     Temp (24hrs), Av.1 °F (36.7 °C), Min:97.3 °F (36.3 °C), Max:98.5 °F (36.9 °C)    In: 701.9   Out: -     Physical Exam:  Constitutional: This is a well developed, well nourished, 18.5-24.9 - Normal 63y.o. year old male who is alert, oriented, cooperative and in no apparent distress. Head:normocephalic and atraumatic. EENT:  PERRLA. No conjunctival injections. Septum was midline, mucosa was without erythema, exudates or cobblestoning. No thrush was noted. Neck: Supple without thyromegaly. No elevated JVP. Trachea was midline. Respiratory: Chest was symmetrical without dullness to percussion. Breath sounds bilaterally were clear to auscultation. There were no wheezes, rhonchi or rales. There is no intercostal retraction or use of accessory muscles. No egophony noted. Cardiovascular: Regular without murmur, clicks, gallops or rubs. Abdomen: Slightly rounded and soft without organomegaly. No rebound, rigidity or guarding was appreciated. Lymphatic: No lymphadenopathy. Musculoskeletal: Normal curvature of the spine. No gross muscle weakness. Extremities:  No lower extremity edema, ulcerations, tenderness, varicosities or erythema. Muscle size, tone and strength are normal.  No involuntary movements are noted. Skin:  Warm and dry. Good color, turgor and pigmentation. No lesions or scars. No cyanosis or clubbing  Neurological/Psychiatric: The patient's general behavior, level of consciousness, thought content and emotional status is normal.        Medications:  Scheduled Medications:    potassium bicarb-citric acid  40 mEq Oral Once    ipratropium-albuterol  1 ampule Inhalation 4x daily    sodium chloride flush  10 mL Intravenous 2 times per day    sodium chloride flush  10 mL Intravenous 2 times per day    aspirin  81 mg Oral Daily    atorvastatin  40 mg Oral Nightly     Continuous Infusions:   PRN Medicationssodium chloride flush, 10 mL, PRN  acetaminophen, 650 mg, Q4H PRN  sodium chloride flush, 10 mL, PRN  acetaminophen, 650 mg, Q6H PRN    Or  acetaminophen, 650 mg, Q6H PRN  polyethylene glycol, 17 g, Daily PRN  promethazine, 12.5 mg, Q6H PRN    Or  ondansetron, 4 mg, Q6H PRN        Diagnostic Labs:  CBC:   Recent Labs     09/02/20  0956 09/03/20  0507 09/04/20  0634   WBC 6.8 5.6 3.9   RBC 4.06* 4.08* 3.93*   HGB 12.7* 12.9* 12.5*   HCT 39.5* 39.7* 37.3*   MCV 97.3 97.3 94.9   RDW 13.2 13.3 13.0   PLT See Reflexed IPF Result 78* 82*     BMP:   Recent Labs     09/02/20  0527 09/03/20  0507 09/04/20  0634    143 142   K 3.9 4.2 3.7    109* 111*   CO2 22 22 22   BUN 19 16 14   CREATININE 1.20 0.88 0.90     BNP: No results for input(s): BNP in the last 72 hours. PT/INR: No results for input(s): PROTIME, INR in the last 72 hours.   APTT:   Recent Labs     09/03/20  0019 09/03/20  0507 09/03/20  1228   APTT 35.0* 36.0* 46.8*     CARDIAC ENZYMES: No results for input(s): CKMB, CKMBINDEX, TROPONINI in the last 72 hours. Invalid input(s): CKTOTAL;3  FASTING LIPID PANEL:  Lab Results   Component Value Date    CHOL 89 09/02/2020    HDL 27 (L) 09/02/2020    TRIG 70 09/02/2020     LIVER PROFILE:   Recent Labs     09/02/20  0527 09/03/20  0507   AST 71* 42*   ALT 58* 40   BILIDIR  --  0.16   BILITOT 0.28* 0.46   ALKPHOS 104 79      MICROBIOLOGY: No results found for: CULTURE    Imaging:    Ct Head Wo Contrast    Result Date: 9/2/2020  No acute intracranial abnormality. Ct Chest Wo Contrast    Result Date: 9/3/2020  No acute CT abnormality in the chest. COPD/emphysema. Multiple lower lobe pulmonary nodules (see recommendations below). Basilar atelectasis or scarring. Prior granulomatous disease. Probable splenomegaly and possible mild hepatomegaly. Hepatic steatosis. RECOMMENDATIONS: Fleischner Society guidelines for follow-up and management of incidentally detected pulmonary nodules: Multiple Solid Nodules: Nodule size less than 6 mm In a low-risk patient, no routine follow-up. In a high-risk patient, optional CT at 12 months. Nodule size equals 6-8 mm In a low-risk patient, CT at 3-6 months, then consider CT at 18-24 months. In a high-risk patient, CT at 3-6 months, then CT at 18-24 months. -Low risk patients include individuals with minimal or absent history of smoking and other known risk factors. - High risk patients include individuals with a history or smoking or known risk factors. Radiology 2017 http://pubs. rsna.org/doi/full/10.1148/radiol. 4163290767     Xr Chest Portable    Result Date: 9/2/2020  Expiratory exam with mild prominence of the pulmonary vascularity/interstitial markings, findings suggest mild vascular congestion Mild streaky bibasilar atelectasis       ASSESSMENT & PLAN     IMPRESSION  This is a 63 y. o. male who presented with altered mental status.      Active Problems:     Altered mental status/ drug induced encephalopathy  Possibly because of drug overdose,received 1 Narcan before coming to hospital  Utox positive for cannabinoids and cocaine. Ammonia mildly elevated-80 ,no flapping asterixis  Alert and oriented now. Will continue to monitor.      troponemia- downtrending  02>65>02>12  Plan: cardiology consulted,d/c heparin  Started on aspirin and statin. Patient NPO,went for stress test today  Echo-EF-65%    Ct chest shows multiple lower lobe nodules,COPD/emphysema picture, bibasilar atelactasis/scarring,prior granulomatous disease,possible splenomegaly/hepatic steatosis. - F/U in 3-6 months     Monitor K and Mg  Keep K>4, Mg>2      lipid panel- HDL low otherwise normal   and HbA1c- 5.6  Hb-12.5     DVT ppx: already on heparin  GI ppx: not indicated     PT/OT/SW: ongoing  Discharge Planning:ongoing       Amy Bermudez MD  Internal Medicine Resident, PGY-1  [address], [place]  9/4/2020, 10:25 AM   Attending Physician Statement  I have discussed the care of Niranjan Urias, including pertinent history and exam findings,  with the resident. I have seen and examined the patient and the key elements of all parts of the encounter have been performed by me. I agree with the assessment, plan and orders as documented by the resident with additions . Treatment plan Discussed with nursing staff in detail , all questions answered . Electronically signed by Jesenia Finney MD on   9/4/20 at 5:56 PM EDT    Please note that this chart was generated using voice recognition Dragon dictation software. Although every effort was made to ensure the accuracy of this automated transcription, some errors in transcription may have occurred.

## 2020-09-04 NOTE — PLAN OF CARE
Ps resident and asked them to place orders for K+ and K+ sliding scale & Mg lab draw since orders state to keep K+ >4 and Mg >2. Orders were placed. Padded side rales d/t seizures. Last trop was 19. PS Internal med senior OC around 446 [id number] for diet order then again around 1310. Waiting on response or orders. Pt told meds to beds that he did not have the money to pay for meds today and that he would come back tomorrow to pick them up.                           Problem: Cardiac:  Goal: Ability to maintain an adequate cardiac output will improve  Description: Ability to maintain an adequate cardiac output will improve  9/4/2020 1048 by Yeni Rosales RN  Outcome: Ongoing  9/4/2020 0336 by Susi Melara RN  Outcome: Ongoing  Goal: Hemodynamic stability will improve  Description: Hemodynamic stability will improve  9/4/2020 1048 by Yeni Rosales RN  Outcome: Ongoing  9/4/2020 0336 by Susi Melara RN  Outcome: Ongoing

## 2020-09-04 NOTE — DISCHARGE INSTR - COC
Continuity of Care Form    Patient Name: Luc Boudreaux   :  1955  MRN:  [id number]    Admit date:  2020  Discharge date:  ***    Code Status Order: Full Code   Advance Directives:     Admitting Physician:  Wayne Prado MD  PCP: No primary care provider on file. Discharging Nurse: [facility] Unit/Room#:   Discharging Unit Phone Number: ***    Emergency Contact:   Extended Emergency Contact Information  Primary Emergency Contact: Artemio York  Address: [address] [place] [address] Phone: [phone]  Relation: Spouse    Past Surgical History:  No past surgical history on file. Immunization History: There is no immunization history on file for this patient.     Active Problems:  Patient Active Problem List   Diagnosis Code    Elevated troponin R79.89    Polysubstance abuse (HCC) F19.10    Cocaine abuse ([facility] F14.10    [facility]non[facility] I21.4    Syncope R55       Isolation/Infection:   Isolation          No Isolation        Patient Infection Status     None to display          Nurse Assessment:  Last Vital Signs: BP (!) 114/50   Pulse 91   Temp 97.3 °F (36.3 °C) (Oral)   Resp 18   Ht 6' (1.829 m)   Wt 169 lb 1.6 oz (76.7 kg)   SpO2 99%   BMI 22.93 kg/m²     Last documented pain score (0-10 scale): Pain Level: 0  Last Weight:   Wt Readings from Last 1 Encounters:   20 169 lb 1.6 oz (76.7 kg)     Mental Status:  {IP PT MENTAL STATUS:}    IV Access:  { CECIL IV ACCESS:118257900}    Nursing Mobility/ADLs:  Walking   {CHP DME VBEL:787036015}  Transfer  {CHP DME UNCO:228346110}  Bathing  {CHP DME EDWL:378246044}  Dressing  {CHP DME JCHK:456321186}  Toileting  {CHP DME PBYC:373061212}  Feeding  {CHP DME BJCY:331186578}  Med Admin  {CHP DME RKWN:230430371}  Med Delivery   {MH CECIL MED Delivery:586671665}    Wound Care Documentation and Therapy: Elimination:  Continence:   · Bowel: {YES / WP:59873}  · Bladder: {YES / OR:79374}  Urinary Catheter: {Urinary Catheter:849445224}   Colostomy/Ileostomy/Ileal Conduit: {YES / IA:27601}       Date of Last BM: ***    Intake/Output Summary (Last 24 hours) at 2020 1608  Last data filed at 2020 0843  Gross per 24 hour   Intake 701.92 ml   Output --   Net 701.92 ml     I/O last 3 completed shifts: In: 701.9 [P.O.:570;  I.V.:131.9]  Out: -     Safety Concerns:     { CECIL Safety Concerns:053247694}    Impairments/Disabilities:      { CECIL Impairments/Disabilities:074966938}    Nutrition Therapy:  Current Nutrition Therapy:   [facility] Diet List:136250503}    Routes of Feeding: {CHP DME Other Feedings:391613021}  Liquids: {Slp liquid thickness:78338}  Daily Fluid Restriction: {CHP DME Yes amt example:329641814}  Last Modified Barium Swallow with Video (Video Swallowing Test): {Done Not Done ANPI:399406630}    Treatments at the Time of Hospital Discharge:   Respiratory Treatments: ***  Oxygen Therapy:  {Therapy; copd oxygen:25897}  Ventilator:    {[facility] Vent MPAF:823927899}    Rehab Therapies: {THERAPEUTIC INTERVENTION:6414657587}  Weight Bearing Status/Restrictions: [facility] Weight Bearin}  Other Medical Equipment (for information only, NOT a DME order):  {EQUIPMENT:049895181}  Other Treatments: ***    Patient's personal belongings (please select all that are sent with patient):  {[facility] DME Belongings:032099731}    RN SIGNATURE:  {Esignature:445883586}    CASE MANAGEMENT/SOCIAL WORK SECTION    Inpatient Status Date: ***    Readmission Risk Assessment Score:  Readmission Risk              Risk of Unplanned Readmission:        13           Discharging to Facility/ Agency   · Name:   · Address:  · Phone:  · Fax:    Dialysis Facility (if applicable)   · Name:  · Address:  · Dialysis Schedule:  · Phone:  · Fax:    / signature: {Esignature:456882344}    PHYSICIAN SECTION    Prognosis: Good    Condition at Discharge: Stable    Rehab Potential (if transferring to Rehab): Fair    Recommended Labs or Other Treatments After Discharge: follow up with PCP and     Physician Certification: I certify the above information and transfer of Matias Marc  is necessary for the continuing treatment of the diagnosis listed and that he requires {Admit to Appropriate Level of Care:82992} for {GREATER/LESS:748611845} 30 days.      Update Admission H&P: {CHP DME Changes in [facility]:067953605}    PHYSICIAN SIGNATURE:  {Esignature:999750735}

## 2020-09-04 NOTE — PLAN OF CARE
Problem: Cardiac:  Goal: Ability to maintain an adequate cardiac output will improve  Description: Ability to maintain an adequate cardiac output will improve  9/4/2020 1655 by Joanne Carlson RN  Outcome: Completed  9/4/2020 1048 by Joanne Carlson RN  Outcome: Ongoing  9/4/2020 0336 by Monika Hook RN  Outcome: Ongoing  Goal: Hemodynamic stability will improve  Description: Hemodynamic stability will improve  9/4/2020 1655 by Joanne Carlson RN  Outcome: Completed  9/4/2020 1048 by Joanne Carlson RN  Outcome: Ongoing  9/4/2020 0336 by Monika Hook RN  Outcome: Ongoing

## 2020-09-04 NOTE — PROGRESS NOTES
[facility]   Progress Note                   Date:   9/4/2020  Patient name: Lisa Cain  Date of admission:  9/2/2020  4:55 AM  MRN:   [id number]  YOB: 1955  PCP: No primary care provider on file. Reason for Admission: Elevated troponin [R79.89]  Elevated troponin [R79.89]    Subjective:       Clinical Changes / Abnormalities: Pt seen and examined in the stress lab. Pt denies any CP or SOB. Pt states that he is leaving today. Medications:   Scheduled Meds:   potassium bicarb-citric acid  40 mEq Oral Once    ipratropium-albuterol  1 ampule Inhalation 4x daily    sodium chloride flush  10 mL Intravenous 2 times per day    sodium chloride flush  10 mL Intravenous 2 times per day    aspirin  81 mg Oral Daily    atorvastatin  40 mg Oral Nightly     Continuous Infusions:  CBC:   Recent Labs     09/02/20  0956 09/03/20  0507 09/04/20  0634   WBC 6.8 5.6 3.9   HGB 12.7* 12.9* 12.5*   PLT See Reflexed IPF Result 78* 82*     BMP:    Recent Labs     09/02/20  0527 09/03/20  0507 09/04/20  0634    143 142   K 3.9 4.2 3.7    109* 111*   CO2 22 22 22   BUN 19 16 14   CREATININE 1.20 0.88 0.90   GLUCOSE 157* 95 111*     Hepatic:   Recent Labs     09/02/20  0527 09/03/20  0507   AST 71* 42*   ALT 58* 40   BILITOT 0.28* 0.46   ALKPHOS 104 79     Troponin:   Recent Labs     09/03/20  0019 09/03/20  0507 09/03/20  1228   TROPHS 25* 23* 19     BNP: No results for input(s): BNP in the last 72 hours. Lipids:   Recent Labs     09/02/20  0804   CHOL 89   HDL 27*     INR: No results for input(s): INR in the last 72 hours. ECHO 9/3/20  Summary  Left ventricle is normal in size with normal systolic function globally. Calculated ejection fraction is 65%. Left atrium is normal in size. Mild increase in left atrial volume. Aortic sclerosis with mild focal calcification. Trace mitral regurgitation. Trace tricuspid regurgitation.  Estimated right ventricular systolic pressure  is 29 mmHg.     Objective:   Vitals: /60   Pulse 91   Temp 97.3 °F (36.3 °C) (Oral)   Resp 20   Ht 6' (1.829 m)   Wt 169 lb 1.6 oz (76.7 kg)   SpO2 95%   BMI 22.93 kg/m²   General appearance: alert and cooperative with exam  HEENT: Head: Normocephalic, no lesions, without obvious abnormality. Neck: no JVD, trachea midline, no adenopathy  Lungs: Clear to auscultation  Heart: Regular rate and rhythm, s1/s2 auscultated, no murmurs  Abdomen: soft, non-tender, bowel sounds active  Extremities: no edema  Neurologic: not done        Assessment / Acute Cardiac Problems:   1. Troponin elevation: NSTEMI vs demand ischemia vs. Coronary vasospasm in setting of positive cocaine on UTox- Patient denies any anginal symptoms  2. AMS-->resolved after Narcan  3. Polysubstance abuse    Patient Active Problem List:     Elevated troponin     Polysubstance abuse (HCC)     Cocaine abuse (HCC)     NSTEMI (non-ST elevated myocardial infarction) ([facility] [address]     Syncope      Plan of Treatment:   1. Elevated trops, likely type II MI, secondary to polysubstance abuse. 2. ECHO reviewed  3. Await stress results. If Negative for ischemia, ok to d/c from cardiology standpoint.       Electronically signed by LYNETTE Lawler CNP on 9/4/2020 at [address] [place] [address]  [phone]

## 2020-09-04 NOTE — PLAN OF CARE
Bed in lowest position. Call light within reach. Pain assessed. Patient on telemetry. Continue to monitor.

## 2020-09-22 PROBLEM — F14.10 COCAINE ABUSE (HCC): Status: RESOLVED | Noted: 2020-09-03 | Resolved: 2020-09-22

## 2020-09-22 PROBLEM — F19.10 POLYSUBSTANCE ABUSE (HCC): Status: RESOLVED | Noted: 2020-09-03 | Resolved: 2020-09-22

## 2020-09-22 PROBLEM — I21.4 NSTEMI (NON-ST ELEVATED MYOCARDIAL INFARCTION) (HCC): Status: RESOLVED | Noted: 2020-09-03 | Resolved: 2020-09-22

## 2020-09-24 NOTE — DISCHARGE SUMMARY
[address]     Department of Internal Medicine - Staff Internal Medicine Teaching Service    INPATIENT DISCHARGE SUMMARY      Patient Identification:  Eben Crockett is a 63 y.o. male. :  1955  MRN: [id number]     Acct: [de-identified]   PCP: No primary care provider on file. Admit Date:  2020  Discharge date and time: 2020  6:12 PM   Attending Provider: No att. providers found                                     [address] Problem Lists:  Principal Problem (Resolved):    NSTEMI (non-ST elevated myocardial infarction) ([facility] [address]  Active Problems:    Elevated troponin    Syncope  Resolved Problems:    Polysubstance abuse ([facility] [address]    Cocaine abuse [facility])      1 [facility] course:   Eben Crockett is a 63 y.o. male who was admitted for the management of NSTEMI (non-ST elevated myocardial infarction) ([facility] [address] presented to the emergency department after he was found unresponsive at home, EMS was called in. No seizure like activity noted. He had no aura,dizziness,diaphoresis or any weird feeling before he passed out. No chest pain, shortness of breath, funny sensation in heart . No previous similar episodes. No previous cardiac history or seizure history. Doesn't take any medications except aspirin for headaches which occur maybe twice a year. Doesn't follow up with any doctor. Didn't have any flu like symptoms, any pain, fever,urinary or bowel complaints. No nausea or vomiting. No recent injury or trauma or fall.  [facility] does have history of narcotic,opiod use, denies cocaine use   His pupils were pinpoint and  1 narcan was given on way to hospital   In the ED, patient was awake and alert, pupils normal, afebrile,vitals stable,saturating well on room air. Extensive track marks on both arms,presumably from patient's history of IV drug abuse. troponins 32>58>67, aspirin and heparin started,cardiology consulted.   Normal sinus rhythm on EKG, no ischemic changes  CT head negative for any acute abnormality. CXR- mild vascular congestion,mild bibasilar atelactasis  Blood tox negative. ,Glucose 157,Hb-10.7,ALT-58,AST-71  Ammonia mildly elevated-80 ,no flapping asterixis  U tox positive for cannabinoids and cocaine. Urine analysis negative for infection. During his stay, troponins eventually went down,heparin was d/c after consulting with cardiology, stress test negative for ischemia. Echo showed EF-65%.     Ct chest showed multiple lower lobe nodules,COPD/emphysema picture, bibasilar atelactasis/scarring,prior granulomatous disease,possible splenomegaly/hepatic steatosis. Will f/u outpatient for it. He is stable for discharge.       Procedures/ Significant Interventions:    none    Consults:     Consults:     Final Specialist Recommendations/Findings:   IP CONSULT TO HOSPITALIST  IP CONSULT TO CARDIOLOGY  IP CONSULT TO CASE MANAGEMENT      Any Hospital Acquired Infections: none    Discharge Functional Status:  stable    DISCHARGE PLAN     Disposition: home    Patient Instructions:   Discharge Medication List as of 9/4/2020  5:04 PM      START taking these medications    Details   aspirin 81 MG chewable tablet Take 1 tablet by mouth daily, Disp-30 tablet,R-3Normal      atorvastatin (LIPITOR) 40 MG tablet Take 1 tablet by mouth nightly, Disp-30 tablet,R-3Normal      ipratropium-albuterol (DUONEB) 0.5-2.5 (3) MG/3ML SOLN nebulizer solution Inhale 3 mLs into the lungs every 4 hours as needed for Shortness of Breath, Disp-126 mL,R-0Normal             Activity: activity as tolerated    Diet: regular diet    Follow-up:    Bernard Lira MD  [address] [facility] [address]    Schedule an appointment as soon as possible for a visit in 1 week  hospital follow up    Favio Hsieh MD  [address]  [address] [facility] [address] [facility]    Schedule an appointment as soon as possible for a visit in 2 weeks  pulmonary nodule      Patient Instructions: Follow up labs: none  Follow up imaging: none    Note that over 30 minutes was spent in preparing discharge papers, discussing discharge with patient, medication review, etc.      Kole Clancy MD, MD  Internal Medicine Resident, PGY-1  [facility];  [place], [place]  9/24/2020, 12:52 PM

## 2020-09-28 ENCOUNTER — VIRTUAL VISIT (OUTPATIENT)
Dept: PULMONOLOGY | Age: 65
End: 2020-09-28
Payer: COMMERCIAL

## 2020-09-28 PROCEDURE — 99214 OFFICE O/P EST MOD 30 MIN: CPT | Performed by: INTERNAL MEDICINE

## 2020-09-28 ASSESSMENT — SLEEP AND FATIGUE QUESTIONNAIRES
HOW LIKELY ARE YOU TO NOD OFF OR FALL ASLEEP WHILE SITTING QUIETLY AFTER LUNCH WITHOUT ALCOHOL: 0
HOW LIKELY ARE YOU TO NOD OFF OR FALL ASLEEP WHILE SITTING INACTIVE IN A PUBLIC PLACE: 0
ESS TOTAL SCORE: 2
HOW LIKELY ARE YOU TO NOD OFF OR FALL ASLEEP IN A CAR, WHILE STOPPED FOR A FEW MINUTES IN TRAFFIC: 0
HOW LIKELY ARE YOU TO NOD OFF OR FALL ASLEEP WHILE SITTING AND READING: 0
HOW LIKELY ARE YOU TO NOD OFF OR FALL ASLEEP WHILE LYING DOWN TO REST IN THE AFTERNOON WHEN CIRCUMSTANCES PERMIT: 2
HOW LIKELY ARE YOU TO NOD OFF OR FALL ASLEEP WHILE SITTING AND TALKING TO SOMEONE: 0
HOW LIKELY ARE YOU TO NOD OFF OR FALL ASLEEP WHILE WATCHING TV: 0
HOW LIKELY ARE YOU TO NOD OFF OR FALL ASLEEP WHEN YOU ARE A PASSENGER IN A CAR FOR AN HOUR WITHOUT A BREAK: 0

## 2020-09-28 NOTE — PROGRESS NOTES
2020    TELEHEALTH EVALUATION -- Audio/Visual (During QGLFP-77 public health emergency)    Patient and physician are located in their individual locations. This is visit is completed via Momail application []/ Doxy. me[x] / Telephone []     HPI:    Dasia Miramontes (:  1955) has requested an audio/video evaluation for the following concern(s):    The gentleman was admitted to [facility] recently because of a mild to status being altered. He was given no oxygen. Pupils were pinpoint. Apparently he has been using drugs. He also has been a smoker and is still smoking. During the work-up patient was noted to have nodules in lower lungs. He been asked to follow-up with me for nodules. No hemoptysis has minimal cough he has about grade 1 effort dyspnea. No other malignancy cardiac enzymes were noted to be elevated however stress test were within normal limits ruling out any cardiac injury. Patient denies any alcohol abuse. Review of Systems    Prior to Visit Medications    Medication Sig Taking? Authorizing Provider   temazepam (RESTORIL) 15 MG capsule Take 1 capsule by mouth nightly as needed for Sleep for up to 30 days. Yes LYNETTE Sands CNP   aspirin 81 MG chewable tablet Take 1 tablet by mouth daily Yes Gregorio Campbell MD   atorvastatin (LIPITOR) 40 MG tablet Take 1 tablet by mouth nightly Yes Gregorio Campbell MD   ipratropium-albuterol (DUONEB) 0.5-2.5 (3) MG/3ML SOLN nebulizer solution Inhale 3 mLs into the lungs every 4 hours as needed for Shortness of Breath Yes Gregorio Campbell MD   varenicline (CHANTIX CONTINUING MONTH AMANDA) 1 MG tablet Take 1 tablet by mouth 2 times daily  Patient not taking: Reported on 2020  LYNETTE Sands CNP       Social History     Tobacco Use    Smoking status: Current Every Day Smoker     Packs/day: 0.25     Years: 45.00     Pack years: 11.25    Smokeless tobacco: Never Used    Tobacco comment: pt states he cut back.    Substance Use Topics    Alcohol use: Not on file    Drug use: Yes     Types: Opiates             RECORD REVIEW: Previous medical records were reviewed at today's visit. Wt Readings from Last 3 Encounters:   09/22/20 160 lb (72.6 kg)   09/04/20 169 lb 1.6 oz (76.7 kg)       Results for orders placed or performed during the hospital encounter of 09/02/20   NM Cardiac Stress Test Nuclear Imaging   Result Value Ref Range    Left Ventricular Ejection Fraction 70     LVEF MODALITY Nuclear    Urine Drug Screen   Result Value Ref Range    Amphetamine Screen, Ur NEGATIVE NEGATIVE    Barbiturate Screen, Ur NEGATIVE NEGATIVE    Benzodiazepine Screen, Urine NEGATIVE NEGATIVE    Cocaine Metabolite, Urine POSITIVE (A) NEGATIVE    Methadone Screen, Urine NEGATIVE NEGATIVE    Opiates, Urine NEGATIVE NEGATIVE    Phencyclidine, Urine NEGATIVE NEGATIVE    Propoxyphene, Urine NOT REPORTED NEGATIVE    Cannabinoid Scrn, Ur POSITIVE (A) NEGATIVE    Oxycodone Screen, Ur NEGATIVE NEGATIVE    Methamphetamine, Urine NOT REPORTED NEGATIVE    Tricyclic Antidepressants, Urine NOT REPORTED NEGATIVE    MDMA, Urine NOT REPORTED NEGATIVE    Buprenorphine Urine NOT REPORTED NEGATIVE    Test Information       Assay provides medical screening only. The absence of expected drug(s) and/or metabolite(s) may indicate diluted or adulterated urine, limitations of testing or timing of collection.    TOX SCR, BLD, ED   Result Value Ref Range    Acetaminophen Level <5 (L) 10 - 30 ug/mL    Ethanol <10 <10 mg/dL    Ethanol percent <5.216 <0.470 %    Salicylate Lvl <1 (L) 3 - 10 mg/dL    Toxic Tricyclic Sc,Blood NEGATIVE NEGATIVE   CBC WITH AUTO DIFFERENTIAL   Result Value Ref Range    WBC 9.5 3.5 - 11.3 k/uL    RBC 4.54 4.21 - 5.77 m/uL    Hemoglobin 14.5 13.0 - 17.0 g/dL    Hematocrit 44.5 40.7 - 50.3 %    MCV 98.0 82.6 - 102.9 fL    MCH 31.9 25.2 - 33.5 pg    MCHC 32.6 28.4 - 34.8 g/dL    RDW 13.2 11.8 - 14.4 %    Platelets 221 (L) 126 - 453 k/uL    MPV 11.3 8.1 - 13.5 fL    NRBC Automated 0.0 0.0 per 100 WBC    Differential Type NOT REPORTED     WBC Morphology NOT REPORTED     RBC Morphology NOT REPORTED     Platelet Estimate NOT REPORTED     Immature Granulocytes 0 0 %    Seg Neutrophils 89 (H) 36 - 65 %    Lymphocytes 6 (L) 24 - 43 %    Monocytes 5 3 - 12 %    Eosinophils % 0 (L) 1 - 4 %    Basophils 0 0 - 2 %    Absolute Immature Granulocyte 0.00 0.00 - 0.30 k/uL    Segs Absolute 8.45 (H) 1.50 - 8.10 k/uL    Absolute Lymph # 0.57 (L) 1.10 - 3.70 k/uL    Absolute Mono # 0.48 0.10 - 1.20 k/uL    Absolute Eos # 0.00 0.00 - 0.44 k/uL    Basophils Absolute 0.00 0.00 - 0.20 k/uL    Morphology Normal    Comprehensive Metabolic Panel   Result Value Ref Range    Glucose 157 (H) 70 - 99 mg/dL    BUN 19 8 - 23 mg/dL    CREATININE 1.20 0.70 - 1.20 mg/dL    Bun/Cre Ratio NOT REPORTED 9 - 20    Calcium 8.4 (L) 8.6 - 10.4 mg/dL    Sodium 139 135 - 144 mmol/L    Potassium 3.9 3.7 - 5.3 mmol/L    Chloride 105 98 - 107 mmol/L    CO2 22 20 - 31 mmol/L    Anion Gap 12 9 - 17 mmol/L    Alkaline Phosphatase 104 40 - 129 U/L    ALT 58 (H) 5 - 41 U/L    AST 71 (H) <40 U/L    Total Bilirubin 0.28 (L) 0.3 - 1.2 mg/dL    Total Protein 6.5 6.4 - 8.3 g/dL    Alb 3.4 (L) 3.5 - 5.2 g/dL    Albumin/Globulin Ratio 1.1 1.0 - 2.5    GFR Non-African American >60 >60 mL/min    GFR African American >60 >60 mL/min    GFR Comment          GFR Staging NOT REPORTED    Urinalysis with microscopic   Result Value Ref Range    Color, UA YELLOW YELLOW    Turbidity UA CLEAR CLEAR    Glucose, Ur NEGATIVE NEGATIVE    Bilirubin Urine NEGATIVE NEGATIVE    Ketones, Urine NEGATIVE NEGATIVE    Specific Gravity, UA 1.024 1.005 - 1.030    Urine Hgb NEGATIVE NEGATIVE    pH, UA 5.0 5.0 - 8.0    Protein, UA NEGATIVE NEGATIVE    Urobilinogen, Urine Normal Normal    Nitrite, Urine NEGATIVE NEGATIVE    Leukocyte Esterase, Urine NEGATIVE NEGATIVE    -          WBC, UA None 0 - 5 /HPF    RBC, UA None 0 - 4 /HPF    Casts UA  0 - 8 /LPF     2 TO 5 Basic Metabolic Panel w/ Reflex to MG   Result Value Ref Range    Glucose 95 70 - 99 mg/dL    BUN 16 8 - 23 mg/dL    CREATININE 0.88 0.70 - 1.20 mg/dL    Bun/Cre Ratio NOT REPORTED 9 - 20    Calcium 8.0 (L) 8.6 - 10.4 mg/dL    Sodium 143 135 - 144 mmol/L    Potassium 4.2 3.7 - 5.3 mmol/L    Chloride 109 (H) 98 - 107 mmol/L    CO2 22 20 - 31 mmol/L    Anion Gap 12 9 - 17 mmol/L    GFR Non-African American >60 >60 mL/min    GFR African American >60 >60 mL/min    GFR Comment          GFR Staging NOT REPORTED    Hepatic function panel   Result Value Ref Range    Alb 2.9 (L) 3.5 - 5.2 g/dL    Alkaline Phosphatase 79 40 - 129 U/L    ALT 40 5 - 41 U/L    AST 42 (H) <40 U/L    Total Bilirubin 0.46 0.3 - 1.2 mg/dL    Bilirubin, Direct 0.16 <0.31 mg/dL    Bilirubin, Indirect 0.30 0.00 - 1.00 mg/dL    Total Protein 5.6 (L) 6.4 - 8.3 g/dL    Globulin NOT REPORTED 1.5 - 3.8 g/dL    Albumin/Globulin Ratio 1.1 1.0 - 2.5   CBC auto differential   Result Value Ref Range    WBC 5.6 3.5 - 11.3 k/uL    RBC 4.08 (L) 4.21 - 5.77 m/uL    Hemoglobin 12.9 (L) 13.0 - 17.0 g/dL    Hematocrit 39.7 (L) 40.7 - 50.3 %    MCV 97.3 82.6 - 102.9 fL    MCH 31.6 25.2 - 33.5 pg    MCHC 32.5 28.4 - 34.8 g/dL    RDW 13.3 11.8 - 14.4 %    Platelets 78 (L) 515 - 453 k/uL    MPV 12.2 8.1 - 13.5 fL    NRBC Automated 0.0 0.0 per 100 WBC    Differential Type NOT REPORTED     WBC Morphology NOT REPORTED     RBC Morphology NOT REPORTED     Platelet Estimate NOT REPORTED     Seg Neutrophils 68 (H) 36 - 65 %    Lymphocytes 22 (L) 24 - 43 %    Monocytes 8 3 - 12 %    Eosinophils % 1 1 - 4 %    Basophils 1 0 - 2 %    Immature Granulocytes 0 0 %    Segs Absolute 3.78 1.50 - 8.10 k/uL    Absolute Lymph # 1.23 1.10 - 3.70 k/uL    Absolute Mono # 0.45 0.10 - 1.20 k/uL    Absolute Eos # 0.08 0.00 - 0.44 k/uL    Basophils Absolute 0.03 0.00 - 0.20 k/uL    Absolute Immature Granulocyte <0.03 0.00 - 0.30 k/uL   APTT   Result Value Ref Range    PTT 35.0 (H) 20.5 - 30.5 sec Troponin   Result Value Ref Range    Troponin, High Sensitivity 25 (H) 0 - 22 ng/L    Troponin T NOT REPORTED <0.03 ng/mL    Troponin Interp NOT REPORTED    Troponin   Result Value Ref Range    Troponin, High Sensitivity 29 (H) 0 - 22 ng/L    Troponin T NOT REPORTED <0.03 ng/mL    Troponin Interp NOT REPORTED    Troponin   Result Value Ref Range    Troponin, High Sensitivity 19 0 - 22 ng/L    Troponin T NOT REPORTED <0.03 ng/mL    Troponin Interp NOT REPORTED    APTT   Result Value Ref Range    PTT 36.0 (H) 20.5 - 30.5 sec   Troponin   Result Value Ref Range    Troponin, High Sensitivity 23 (H) 0 - 22 ng/L    Troponin T NOT REPORTED <0.03 ng/mL    Troponin Interp NOT REPORTED    APTT   Result Value Ref Range    PTT 46.8 (H) 20.5 - 30.5 sec   Basic Metabolic Panel w/ Reflex to MG   Result Value Ref Range    Glucose 111 (H) 70 - 99 mg/dL    BUN 14 8 - 23 mg/dL    CREATININE 0.90 0.70 - 1.20 mg/dL    Bun/Cre Ratio NOT REPORTED 9 - 20    Calcium 8.1 (L) 8.6 - 10.4 mg/dL    Sodium 142 135 - 144 mmol/L    Potassium 3.7 3.7 - 5.3 mmol/L    Chloride 111 (H) 98 - 107 mmol/L    CO2 22 20 - 31 mmol/L    Anion Gap 9 9 - 17 mmol/L    GFR Non-African American >60 >60 mL/min    GFR African American >60 >60 mL/min    GFR Comment          GFR Staging NOT REPORTED    CBC auto differential   Result Value Ref Range    WBC 3.9 3.5 - 11.3 k/uL    RBC 3.93 (L) 4.21 - 5.77 m/uL    Hemoglobin 12.5 (L) 13.0 - 17.0 g/dL    Hematocrit 37.3 (L) 40.7 - 50.3 %    MCV 94.9 82.6 - 102.9 fL    MCH 31.8 25.2 - 33.5 pg    MCHC 33.5 28.4 - 34.8 g/dL    RDW 13.0 11.8 - 14.4 %    Platelets 82 (L) 120 - 453 k/uL    MPV 11.6 8.1 - 13.5 fL    NRBC Automated 0.0 0.0 per 100 WBC    Differential Type NOT REPORTED     Seg Neutrophils 69 (H) 36 - 65 %    Lymphocytes 21 (L) 24 - 43 %    Monocytes 8 3 - 12 %    Eosinophils % 1 1 - 4 %    Basophils 1 0 - 2 %    Immature Granulocytes 0 0 %    Segs Absolute 2.67 1.50 - 8.10 k/uL    Absolute Lymph # 0.82 (L) 1.10 - 3.70 k/uL    Absolute Mono # 0.31 0.10 - 1.20 k/uL    Absolute Eos # 0.04 0.00 - 0.44 k/uL    Basophils Absolute <0.03 0.00 - 0.20 k/uL    Absolute Immature Granulocyte <0.03 0.00 - 0.30 k/uL    WBC Morphology NOT REPORTED     RBC Morphology NOT REPORTED     Platelet Estimate NOT REPORTED    Magnesium   Result Value Ref Range    Magnesium 2.1 1.6 - 2.6 mg/dL   EKG 12 Lead   Result Value Ref Range    Ventricular Rate 96 BPM    Atrial Rate 96 BPM    P-R Interval 142 ms    QRS Duration 82 ms    Q-T Interval 362 ms    QTc Calculation (Bazett) 457 ms    P Axis 74 degrees    R Axis 54 degrees    T Axis 54 degrees   EKG 12 Lead   Result Value Ref Range    Ventricular Rate 77 BPM    Atrial Rate 77 BPM    P-R Interval 142 ms    QRS Duration 76 ms    Q-T Interval 408 ms    QTc Calculation (Bazett) 461 ms    P Axis 68 degrees    R Axis 41 degrees    T Axis 40 degrees   EKG 12 Lead   Result Value Ref Range    Ventricular Rate 67 BPM    Atrial Rate 67 BPM    P-R Interval 142 ms    QRS Duration 70 ms    Q-T Interval 432 ms    QTc Calculation (Bazett) 456 ms    P Axis 74 degrees    R Axis 54 degrees    T Axis 39 degrees   ECHO Complete 2D W Doppler W Color   Result Value Ref Range    Left Ventricular Ejection Fraction 65     LVEF MODALITY ECHO        Ct Head Wo Contrast    Result Date: 9/2/2020  EXAMINATION: CT OF THE HEAD WITHOUT CONTRAST  9/2/2020 5:27 am TECHNIQUE: CT of the head was performed without the administration of intravenous contrast. Dose modulation, iterative reconstruction, and/or weight based adjustment of the mA/kV was utilized to reduce the radiation dose to as low as reasonably achievable. COMPARISON: None. HISTORY: ORDERING SYSTEM PROVIDED HISTORY: AMS, possible seizure TECHNOLOGIST PROVIDED HISTORY: AMS, possible seizure FINDINGS: BRAIN/VENTRICLES: There is no acute intracranial hemorrhage, mass effect or midline shift. No abnormal extra-axial fluid collection.   The gray-white differentiation is maintained without evidence of an acute infarct. There is no evidence of hydrocephalus. ORBITS: The visualized portion of the orbits demonstrate no acute abnormality. SINUSES: The visualized paranasal sinuses and mastoid air cells demonstrate no acute abnormality. SOFT TISSUES/SKULL:  No acute abnormality of the visualized skull or soft tissues. No acute intracranial abnormality. Ct Chest Wo Contrast    Result Date: 9/3/2020  EXAMINATION: CT OF THE CHEST WITHOUT CONTRAST 9/3/2020 11:43 am TECHNIQUE: CT of the chest was performed without the administration of intravenous contrast. Multiplanar reformatted images are provided for review. Dose modulation, iterative reconstruction, and/or weight based adjustment of the mA/kV was utilized to reduce the radiation dose to as low as reasonably achievable. COMPARISON: None. HISTORY: ORDERING SYSTEM PROVIDED HISTORY: smoker. syncope. TECHNOLOGIST PROVIDED HISTORY: smoker. syncope. Reason for Exam: smoker. syncope. Acuity: Unknown Type of Exam: Unknown Additional history of polysubstance abuse and non ST elevated myocardial infarction. FINDINGS: Mediastinum: Cardiac chambers within normal limits in size and configuration. Tiny anterior pericardial effusion. Mild CAD. Main pulmonary artery caliber WNL. No acute aortic finding on this unenhanced scan. No aneurysm. Scattered small mediastinal nodes but no pathologic adenopathy. No thyroid abnormality. Lungs/pleura: Paraseptal predominant emphysematous changes throughout. Basilar atelectatic or fibrotic changes. Tiny calcified granuloma right base. Two 6 mm nodules and LLL (best visualized slices 89 and 80, series 4). 5 mm pleural based nodule medial right base (slice 78 of series 4). Additional 4 mm nodule medial LLL (slice 62, series 4). No additional nodule. No infiltrate, pleural effusion or pneumothorax. Tracheobronchial tree patent. Upper Abdomen: Suspected splenomegaly and possible mild hepatomegaly.  Diffuse mildly diminished hepatic attenuation. No clear-cut focal abnormality. Partially contracted gallbladder. Soft Tissues/Bones: Mild kyphoscoliosis and DJD thoracic spine, but no focal or acute bony or soft tissue abnormality. No acute CT abnormality in the chest. COPD/emphysema. Multiple lower lobe pulmonary nodules (see recommendations below). Basilar atelectasis or scarring. Prior granulomatous disease. Probable splenomegaly and possible mild hepatomegaly. Hepatic steatosis. RECOMMENDATIONS: Fleischner Society guidelines for follow-up and management of incidentally detected pulmonary nodules: Multiple Solid Nodules: Nodule size less than 6 mm In a low-risk patient, no routine follow-up. In a high-risk patient, optional CT at 12 months. Nodule size equals 6-8 mm In a low-risk patient, CT at 3-6 months, then consider CT at 18-24 months. In a high-risk patient, CT at 3-6 months, then CT at 18-24 months. -Low risk patients include individuals with minimal or absent history of smoking and other known risk factors. - High risk patients include individuals with a history or smoking or known risk factors. Radiology 2017 http://pubs. rsna.org/doi/full/10.1148/radiol. 8917192773     Xr Chest Portable    Result Date: 9/2/2020  EXAMINATION: ONE XRAY VIEW OF THE CHEST 9/2/2020 7:36 am COMPARISON: None. HISTORY: ORDERING SYSTEM PROVIDED HISTORY: AMS, elevated trop TECHNOLOGIST PROVIDED HISTORY: AMS, elevated trop Reason for Exam: AMS< elevated trop FINDINGS: The cardiopericardial silhouette is normal in size and configuration.  Expiratory exam, low volume lungs with mild prominence of the pulmonary vascularity/interstitial markings with mild streaky bibasilar density No significant pleural process     Expiratory exam with mild prominence of the pulmonary vascularity/interstitial markings, findings suggest mild vascular congestion Mild streaky bibasilar atelectasis     Nm Cardiac Stress Test Nuclear Imaging    Result Date: 9/4/2020  EXAMINATION: MYOCARDIAL PERFUSION IMAGING 9/4/2020 9:00 am TECHNIQUE: Rest dose:  15.3 mCi Tc-99m sestamibi intravenously Stress dose:  38.3 mCi Tc-99m sestamibi intravenously Under cardiology supervision, 0.4 mg Lexiscan was infused intravenously prior to injection of the stress dose. SPECT imaging was acquired following injection of the sestamibi. ECG gating was obtained following the stress acquisition. COMPARISON: None Available. HISTORY: ORDERING SYSTEM PROVIDED HISTORY: Chest pain TECHNOLOGIST PROVIDED HISTORY: Reason for Exam: Chest pain Procedure Type->Rx chest pain Reason for Exam: Chest pain, lightheaded, NSTEMI, echo LVEF=65%. 82-year-old male with chest pain FINDINGS: The patient achieved a maximum heart rate of 106 beats per minute, 67 % of the maximum age predicted heart rate of 157 beats per minute. Perfusion: Photopenia of the inferior wall on standard prone and rest imaging resolves on prone stress imaging likely attenuation artifact. There is no scintigraphic evidence for a reversible or fixed perfusion defect to suggest reversible ischemia or infarct. Function: The gated SPECT data demonstrates normal left ventricular size and normal wall motion. Left ventricular ejection fraction:  70% TID score:  1.27 (threshold value of 1.39 is used for Lexiscan stress with Tc-99m). There is no stress-induced cavitary dilatation to suggest compensated triple vessel disease. End diastolic volume:  10PA Scores are visually adjusted to account for potential artifact. Summed stress score:  0 Summed rest score:  0 Summed reversibility score:  0     1. No definitive scintigraphic evidence for reversible ischemia or infarct. 2. Left ventricular ejection fraction of 70%. 3.  Please see report for EKG portion of the examination which will be performed separately by physician from cardiology. Risk stratification:  Low risk Note:  Risk stratification incorporates both clinical history and test results.   Final Vitals/Constitutional/EENT/Resp/CV/GI//MS/Neuro/Skin/Heme-Lymph-Imm. Constitutional: [x] Appears well-developed and well-nourished. [] Abnormal  Mental status  [x] Alert and awake  [x] Oriented to person/place/time [x]Able to follow commands    [x] No apparent distress      Eyes:  EOM    [x]  Normal  [] Abnormal-  Sclera  [x]  Normal  [] Abnormal -         Discharge [x]  None visible  [] Abnormal -    HENT:   [x] Normocephalic, atraumatic. [] Abnormal shaped head   [x] Mouth/Throat: Mucous membranes are moist.     Ears [x] Normal  [] Abnormal-    Neck: [x] Normal range of motion [x] Supple [x] No visualized mass. Pulmonary/Chest: [x] Respiratory effort normal.  [x] No visualized signs of difficulty breathing or respiratory distress        [] Abnormal      Musculoskeletal:   [x] Normal range of motion. [x] Normal gait with no signs of ataxia. [x]  No signs of cyanosis of the peripheral portions of extremities. [] Abnormal       Neurological:        [x] Normal cranial nerve (limited exam to video visit) [x] No focal weakness observed       [] Abnormal          Speech       [x] Normal   [] Abnormal     Skin:        [x] No rash on visible skin  [x] Normal  [] Abnormal     Psychiatric:       [x] Normal  [] Abnormal        [x] Normal Mood  [] Anxious appearing        Other Pertinent Exam Findings:       ASSESSMENT:  1. Lung nodule < 6cm on CT    Possible COPD due to smoking. Drug abuse      Plan:   1. Patient has bibasilar lung nodules which most likely are nonmalignant. We will get a repeat CT of the chest in 3 months to follow the nodule. His nodules show any change or especially growth or any other new nodules and will consider PET scanning and or biopsy of the biopsy. 2.   3. Patient advised to give up smoking altogether. 4.   5. Patient advised to stop using drugs. 6.   7. Patient advised to get a influenza vaccine. 8.   9. He denies any chest pain  10.  Weight and appetite are stable. 11. Patient advised to take adequate precaution against exposure to COVID. 12. I will see him in follow-up in 3 months after CT scan has been performed  13.   14. He denies any pulmonary symptoms. He is not taking any inhalers at this time. 15. An  electronic signature was used to authenticate this note. --Shreya Jimenez MD on 9/28/2020 at 11:30 AM    9}    Pursuant to the emergency declaration under the [address] [place] [address] waiver authority and the [employer] Act, this Virtual  Visit was conducted, with patient's consent, to reduce the patient's risk of exposure to COVID-19 and provide continuity of care for an established patient.     Services were provided through a video synchronous discussion virtually to substitute for in-person clinic visit.     _______________________________________________________________________________________________________________________________________________

## 2020-10-02 PROBLEM — R79.89 ELEVATED TROPONIN: Status: RESOLVED | Noted: 2020-09-02 | Resolved: 2020-10-02

## 2020-10-02 PROBLEM — R77.8 ELEVATED TROPONIN: Status: RESOLVED | Noted: 2020-09-02 | Resolved: 2020-10-02

## 2020-12-23 DIAGNOSIS — R91.1 LUNG NODULE: Primary | ICD-10-CM
